# Patient Record
Sex: MALE | Race: WHITE | Employment: FULL TIME | URBAN - METROPOLITAN AREA
[De-identification: names, ages, dates, MRNs, and addresses within clinical notes are randomized per-mention and may not be internally consistent; named-entity substitution may affect disease eponyms.]

---

## 2017-04-13 ENCOUNTER — RECORDS - HEALTHEAST (OUTPATIENT)
Dept: GENERAL RADIOLOGY | Facility: CLINIC | Age: 42
End: 2017-04-13

## 2017-04-13 ENCOUNTER — OFFICE VISIT - HEALTHEAST (OUTPATIENT)
Dept: INTERNAL MEDICINE | Facility: CLINIC | Age: 42
End: 2017-04-13

## 2017-04-13 DIAGNOSIS — M79.672 LEFT FOOT PAIN: ICD-10-CM

## 2017-04-13 DIAGNOSIS — M79.672 PAIN IN LEFT FOOT: ICD-10-CM

## 2017-04-13 RX ORDER — ASCORBIC ACID 500 MG
500 TABLET ORAL DAILY
Status: SHIPPED | COMMUNITY
Start: 2017-04-13

## 2017-04-13 ASSESSMENT — MIFFLIN-ST. JEOR: SCORE: 2102.55

## 2017-04-14 ENCOUNTER — COMMUNICATION - HEALTHEAST (OUTPATIENT)
Dept: INTERNAL MEDICINE | Facility: CLINIC | Age: 42
End: 2017-04-14

## 2017-08-23 ENCOUNTER — COMMUNICATION - HEALTHEAST (OUTPATIENT)
Dept: INTERNAL MEDICINE | Facility: CLINIC | Age: 42
End: 2017-08-23

## 2017-08-23 DIAGNOSIS — M79.672 LEFT FOOT PAIN: ICD-10-CM

## 2017-10-03 ENCOUNTER — OFFICE VISIT - HEALTHEAST (OUTPATIENT)
Dept: PODIATRY | Age: 42
End: 2017-10-03

## 2017-10-03 DIAGNOSIS — S93.602S FOOT SPRAIN, LEFT, SEQUELA: ICD-10-CM

## 2017-10-03 ASSESSMENT — MIFFLIN-ST. JEOR: SCORE: 2102.55

## 2019-01-11 ENCOUNTER — OFFICE VISIT - HEALTHEAST (OUTPATIENT)
Dept: INTERNAL MEDICINE | Facility: CLINIC | Age: 44
End: 2019-01-11

## 2019-01-11 DIAGNOSIS — Z00.00 ROUTINE GENERAL MEDICAL EXAMINATION AT A HEALTH CARE FACILITY: ICD-10-CM

## 2019-01-11 LAB
ALBUMIN SERPL-MCNC: 4.5 G/DL (ref 3.5–5)
ALBUMIN UR-MCNC: ABNORMAL MG/DL
ALP SERPL-CCNC: 61 U/L (ref 45–120)
ALT SERPL W P-5'-P-CCNC: 25 U/L (ref 0–45)
ANION GAP SERPL CALCULATED.3IONS-SCNC: 13 MMOL/L (ref 5–18)
APPEARANCE UR: CLEAR
AST SERPL W P-5'-P-CCNC: 26 U/L (ref 0–40)
BACTERIA #/AREA URNS HPF: ABNORMAL HPF
BILIRUB SERPL-MCNC: 2.1 MG/DL (ref 0–1)
BILIRUB UR QL STRIP: NEGATIVE
BUN SERPL-MCNC: 14 MG/DL (ref 8–22)
CALCIUM SERPL-MCNC: 9.4 MG/DL (ref 8.5–10.5)
CHLORIDE BLD-SCNC: 102 MMOL/L (ref 98–107)
CHOLEST SERPL-MCNC: 232 MG/DL
CO2 SERPL-SCNC: 23 MMOL/L (ref 22–31)
COLOR UR AUTO: YELLOW
CREAT SERPL-MCNC: 1.18 MG/DL (ref 0.7–1.3)
ERYTHROCYTE [DISTWIDTH] IN BLOOD BY AUTOMATED COUNT: 12.5 % (ref 11–14.5)
FASTING STATUS PATIENT QL REPORTED: YES
GFR SERPL CREATININE-BSD FRML MDRD: >60 ML/MIN/1.73M2
GLUCOSE BLD-MCNC: 86 MG/DL (ref 70–125)
GLUCOSE UR STRIP-MCNC: NEGATIVE MG/DL
HCT VFR BLD AUTO: 46.6 % (ref 40–54)
HDLC SERPL-MCNC: 44 MG/DL
HGB BLD-MCNC: 15.3 G/DL (ref 14–18)
HGB UR QL STRIP: NEGATIVE
KETONES UR STRIP-MCNC: ABNORMAL MG/DL
LDLC SERPL CALC-MCNC: 175 MG/DL
LEUKOCYTE ESTERASE UR QL STRIP: NEGATIVE
MCH RBC QN AUTO: 29.3 PG (ref 27–34)
MCHC RBC AUTO-ENTMCNC: 32.8 G/DL (ref 32–36)
MCV RBC AUTO: 89 FL (ref 80–100)
MUCOUS THREADS #/AREA URNS LPF: ABNORMAL LPF
NITRATE UR QL: NEGATIVE
PH UR STRIP: 5.5 [PH] (ref 4.5–8)
PLATELET # BLD AUTO: 203 THOU/UL (ref 140–440)
PMV BLD AUTO: 10.4 FL (ref 8.5–12.5)
POTASSIUM BLD-SCNC: 3.9 MMOL/L (ref 3.5–5)
PROT SERPL-MCNC: 7.5 G/DL (ref 6–8)
RBC # BLD AUTO: 5.23 MILL/UL (ref 4.4–6.2)
RBC #/AREA URNS AUTO: ABNORMAL HPF
SODIUM SERPL-SCNC: 138 MMOL/L (ref 136–145)
SP GR UR STRIP: 1.02 (ref 1–1.03)
SQUAMOUS #/AREA URNS AUTO: ABNORMAL LPF
TRIGL SERPL-MCNC: 64 MG/DL
UROBILINOGEN UR STRIP-ACNC: ABNORMAL
WBC #/AREA URNS AUTO: ABNORMAL HPF
WBC: 5.3 THOU/UL (ref 4–11)

## 2019-01-11 ASSESSMENT — MIFFLIN-ST. JEOR: SCORE: 2070.8

## 2019-01-14 LAB — 25(OH)D3 SERPL-MCNC: 36.2 NG/ML (ref 30–80)

## 2019-01-15 ENCOUNTER — COMMUNICATION - HEALTHEAST (OUTPATIENT)
Dept: INTERNAL MEDICINE | Facility: CLINIC | Age: 44
End: 2019-01-15

## 2019-01-25 ENCOUNTER — RECORDS - HEALTHEAST (OUTPATIENT)
Dept: ADMINISTRATIVE | Facility: OTHER | Age: 44
End: 2019-01-25

## 2019-02-25 ENCOUNTER — RECORDS - HEALTHEAST (OUTPATIENT)
Dept: ADMINISTRATIVE | Facility: OTHER | Age: 44
End: 2019-02-25

## 2019-07-11 ENCOUNTER — RECORDS - HEALTHEAST (OUTPATIENT)
Dept: ADMINISTRATIVE | Facility: OTHER | Age: 44
End: 2019-07-11

## 2020-01-13 ENCOUNTER — OFFICE VISIT - HEALTHEAST (OUTPATIENT)
Dept: INTERNAL MEDICINE | Facility: CLINIC | Age: 45
End: 2020-01-13

## 2020-01-13 DIAGNOSIS — C43.59 MALIGNANT MELANOMA OF TORSO EXCLUDING BREAST (H): ICD-10-CM

## 2020-01-13 DIAGNOSIS — Z00.00 ROUTINE GENERAL MEDICAL EXAMINATION AT A HEALTH CARE FACILITY: ICD-10-CM

## 2020-01-13 DIAGNOSIS — Z23 IMMUNIZATION DUE: ICD-10-CM

## 2020-01-13 DIAGNOSIS — Z23 NEED FOR PROPHYLACTIC VACCINATION WITH TETANUS-DIPHTHERIA (TD): ICD-10-CM

## 2020-01-13 LAB
ALBUMIN SERPL-MCNC: 4.4 G/DL (ref 3.5–5)
ALBUMIN UR-MCNC: NEGATIVE MG/DL
ALP SERPL-CCNC: 65 U/L (ref 45–120)
ALT SERPL W P-5'-P-CCNC: 15 U/L (ref 0–45)
ANION GAP SERPL CALCULATED.3IONS-SCNC: 14 MMOL/L (ref 5–18)
APPEARANCE UR: CLEAR
AST SERPL W P-5'-P-CCNC: 16 U/L (ref 0–40)
BILIRUB SERPL-MCNC: 1.8 MG/DL (ref 0–1)
BILIRUB UR QL STRIP: NEGATIVE
BUN SERPL-MCNC: 13 MG/DL (ref 8–22)
CALCIUM SERPL-MCNC: 9.7 MG/DL (ref 8.5–10.5)
CHLORIDE BLD-SCNC: 104 MMOL/L (ref 98–107)
CHOLEST SERPL-MCNC: 229 MG/DL
CO2 SERPL-SCNC: 23 MMOL/L (ref 22–31)
COLOR UR AUTO: YELLOW
CREAT SERPL-MCNC: 1.13 MG/DL (ref 0.7–1.3)
ERYTHROCYTE [DISTWIDTH] IN BLOOD BY AUTOMATED COUNT: 12.2 % (ref 11–14.5)
FASTING STATUS PATIENT QL REPORTED: YES
GFR SERPL CREATININE-BSD FRML MDRD: >60 ML/MIN/1.73M2
GLUCOSE BLD-MCNC: 99 MG/DL (ref 70–125)
GLUCOSE UR STRIP-MCNC: NEGATIVE MG/DL
HCT VFR BLD AUTO: 44.9 % (ref 40–54)
HDLC SERPL-MCNC: 45 MG/DL
HGB BLD-MCNC: 15.4 G/DL (ref 14–18)
HGB UR QL STRIP: NEGATIVE
KETONES UR STRIP-MCNC: NEGATIVE MG/DL
LDLC SERPL CALC-MCNC: 166 MG/DL
LEUKOCYTE ESTERASE UR QL STRIP: NEGATIVE
MCH RBC QN AUTO: 30.2 PG (ref 27–34)
MCHC RBC AUTO-ENTMCNC: 34.4 G/DL (ref 32–36)
MCV RBC AUTO: 88 FL (ref 80–100)
NITRATE UR QL: NEGATIVE
PH UR STRIP: 5.5 [PH] (ref 5–8)
PLATELET # BLD AUTO: 181 THOU/UL (ref 140–440)
PMV BLD AUTO: 7.8 FL (ref 7–10)
POTASSIUM BLD-SCNC: 3.7 MMOL/L (ref 3.5–5)
PROT SERPL-MCNC: 7.5 G/DL (ref 6–8)
RBC # BLD AUTO: 5.1 MILL/UL (ref 4.4–6.2)
SODIUM SERPL-SCNC: 141 MMOL/L (ref 136–145)
SP GR UR STRIP: 1.02 (ref 1–1.03)
TRIGL SERPL-MCNC: 90 MG/DL
UROBILINOGEN UR STRIP-ACNC: NORMAL
WBC: 5 THOU/UL (ref 4–11)

## 2020-01-13 ASSESSMENT — MIFFLIN-ST. JEOR: SCORE: 1943.79

## 2020-01-14 ENCOUNTER — COMMUNICATION - HEALTHEAST (OUTPATIENT)
Dept: INTERNAL MEDICINE | Facility: CLINIC | Age: 45
End: 2020-01-14

## 2020-04-06 ENCOUNTER — OFFICE VISIT - HEALTHEAST (OUTPATIENT)
Dept: INTERNAL MEDICINE | Facility: CLINIC | Age: 45
End: 2020-04-06

## 2020-04-08 ENCOUNTER — OFFICE VISIT - HEALTHEAST (OUTPATIENT)
Dept: INTERNAL MEDICINE | Facility: CLINIC | Age: 45
End: 2020-04-08

## 2020-04-08 DIAGNOSIS — K64.4 EXTERNAL HEMORRHOIDS: ICD-10-CM

## 2020-04-08 ASSESSMENT — MIFFLIN-ST. JEOR: SCORE: 1939.26

## 2021-04-28 ENCOUNTER — COMMUNICATION - HEALTHEAST (OUTPATIENT)
Dept: SCHEDULING | Facility: CLINIC | Age: 46
End: 2021-04-28

## 2021-04-29 ENCOUNTER — COMMUNICATION - HEALTHEAST (OUTPATIENT)
Dept: PHARMACY | Facility: CLINIC | Age: 46
End: 2021-04-29

## 2021-04-29 ENCOUNTER — OFFICE VISIT - HEALTHEAST (OUTPATIENT)
Dept: INTERNAL MEDICINE | Facility: CLINIC | Age: 46
End: 2021-04-29

## 2021-04-29 DIAGNOSIS — U07.1 CLINICAL DIAGNOSIS OF COVID-19: ICD-10-CM

## 2021-05-10 ENCOUNTER — AMBULATORY - HEALTHEAST (OUTPATIENT)
Dept: LAB | Facility: CLINIC | Age: 46
End: 2021-05-10

## 2021-05-10 DIAGNOSIS — U07.1 CLINICAL DIAGNOSIS OF COVID-19: ICD-10-CM

## 2021-05-11 LAB
PATIENT'S ETHNICITY: ABNORMAL
PATIENT'S RACE: ABNORMAL
SARS-COV-2 AB SERPL QL IA: POSITIVE

## 2021-05-12 ENCOUNTER — COMMUNICATION - HEALTHEAST (OUTPATIENT)
Dept: INTERNAL MEDICINE | Facility: CLINIC | Age: 46
End: 2021-05-12

## 2021-05-30 VITALS — HEIGHT: 76 IN | BODY MASS INDEX: 30.44 KG/M2 | WEIGHT: 250 LBS

## 2021-05-31 VITALS — BODY MASS INDEX: 30.44 KG/M2 | HEIGHT: 76 IN | WEIGHT: 250 LBS

## 2021-06-02 VITALS — HEIGHT: 76 IN | BODY MASS INDEX: 29.59 KG/M2 | WEIGHT: 243 LBS

## 2021-06-04 VITALS
SYSTOLIC BLOOD PRESSURE: 110 MMHG | HEART RATE: 74 BPM | DIASTOLIC BLOOD PRESSURE: 64 MMHG | HEIGHT: 76 IN | OXYGEN SATURATION: 98 % | WEIGHT: 215 LBS | BODY MASS INDEX: 26.18 KG/M2

## 2021-06-04 VITALS
SYSTOLIC BLOOD PRESSURE: 100 MMHG | HEART RATE: 66 BPM | WEIGHT: 214 LBS | HEIGHT: 76 IN | OXYGEN SATURATION: 98 % | BODY MASS INDEX: 26.06 KG/M2 | DIASTOLIC BLOOD PRESSURE: 72 MMHG

## 2021-06-05 NOTE — PROGRESS NOTES
Office Visit - Physical   Deonte Abraham   44 y.o.  male    Date of visit: 1/13/2020  Physician: Julian Paris MD     Assessment and Plan   1. Routine general medical examination at a health care facility  Patient was a healthy active lifestyle.  Continue same.  More aerobic exercise would be beneficial here but he is done remarkably well with his weight loss.  He refuses flu shot today.  Tetanus was updated today.  - Comprehensive Metabolic Panel  - Urinalysis-UC if Indicated  - Lipid Cascade  - HM2(CBC w/o Differential)    2. Malignant melanoma of torso excluding breast (H)  Would like to get those records.  Many years ago.  He I have urged yearly skin exams from dermatology.  I reviewed his most recent dermatologic evaluation and it does not appear that they are aware that he has a history of melanoma.  That would be something that would be important for them to know I would think.        Return in about 1 year (around 1/13/2021) for Annual physical.     Chief Complaint   Chief Complaint   Patient presents with     Annual Exam     fasting        Patient Profile   Social History     Social History Narrative     Not on file        Past Medical History   Patient Active Problem List   Diagnosis     Acute Sinusitis     Joint Pain, Localized In The Knee     Malignant melanoma of torso excluding breast (H)       Past Surgical History  He has no past surgical history on file.     History of Present Illness   This 44 y.o. old Stan comes in today for physical.  Reports his been doing well.  He is been doing intermittent fasting and is lost nearly 30 pounds as a result.  Feels well.  Does do some walking dog and some work around the warehouse but otherwise not a lot of routine exercise.  Currently not in a relationship.  That is okay.  Has his dog.  Also has a [place] [facility] [place] and [place] [facility] [place]  Doing well.  Parents are doing well.  Brother is doing well.  No new somatic concerns.  His remote history of a  "melanoma 10 or 20 years ago.  I do not have those records.    Review of Systems: A comprehensive review of systems was negative except as noted.     Medications and Allergies   Current Outpatient Medications   Medication Sig Dispense Refill     ascorbic acid, vitamin C, (ASCORBIC ACID WITH ALLIE HIPS) 500 MG tablet Take 500 mg by mouth daily.       cholecalciferol, vitamin D3, (VITAMIN D3) 1,000 unit capsule Take 1,000 Units by mouth daily.       DOCOSAHEXANOIC ACID/EPA (FISH OIL ORAL) Take 1 capsule by mouth daily.       MULTIVITAMIN ORAL Take 1 tablet by mouth daily.       mupirocin (BACTROBAN) 2 % ointment Apply topically 2 (two) times a day. 15 g 0     No current facility-administered medications for this visit.      No Known Allergies     Family and Social History   No family history on file.     Social History     Tobacco Use     Smoking status: Never Smoker     Smokeless tobacco: Never Used   Substance Use Topics     Alcohol use: No     Drug use: No        Physical Exam   General Appearance:   Pleasant gentleman who looks well and in good spirits.    /64 (Patient Site: Right Arm, Patient Position: Sitting, Cuff Size: Adult Regular)   Pulse 74   Ht 6' 3.5\" (1.918 m)   Wt 215 lb (97.5 kg)   SpO2 98%   BMI 26.52 kg/m      EYES: Eyelids, conjunctiva, and sclera were normal. Pupils were normal. Cornea, iris, and lens were normal bilaterally.  HEAD, EARS, NOSE, MOUTH, AND THROAT: Head and face were normal. Hearing was normal to voice and the ears were normal to external exam. Nose appearance was normal and there was no discharge. Oropharynx was normal.  NECK: Neck appearance was normal. There were no neck masses and the thyroid was not enlarged.  RESPIRATORY: Breathing pattern was normal and the chest moved symmetrically.  Percussion/auscultatory percussion was normal.  Lung sounds were normal and there were no abnormal sounds.  CARDIOVASCULAR: Heart rate and rhythm were normal.  S1 and S2 were normal and " there were no extra sounds or murmurs. Peripheral pulses in arms and legs were normal.  Jugular venous pressure was normal.  There was no peripheral edema.  GASTROINTESTINAL: The abdomen was normal in contour.  Bowel sounds were present.  Percussion detected no organ enlargement or tenderness.  Palpation detected no tenderness, mass, or enlarged organs.   MUSCULOSKELETAL: Skeletal configuration was normal and muscle mass was normal for age. Joint appearance was overall normal.  LYMPHATIC: There were no enlarged nodes.  SKIN/HAIR/NAILS: Skin color was normal.  There were no skin lesions.  Hair and nails were normal.  NEUROLOGIC: The patient was alert and oriented to person, place, time, and circumstance. Speech was normal. Cranial nerves were normal. Motor strength was normal for age. The patient was normally coordinated.  PSYCHIATRIC:  Mood and affect were normal and the patient had normal recent and remote memory. The patient's judgment and insight were normal.    ADDITIONAL VITAL SIGNS: none  CHEST WALL/BREASTS: nml    RECTAL: def  GENITAL/URINARY: nml penis and testes.      Additional Information        Julian Paris MD  Internal Medicine  Contact me at [phone]

## 2021-06-07 NOTE — PROGRESS NOTES
"Deonte Abraham is a 44 y.o. male who is being evaluated via a billable telephone visit.      The patient has been notified of following:     \"This telephone visit will be conducted via a call between you and your physician/provider. We have found that certain health care needs can be provided without the need for a physical exam.  This service lets us provide the care you need with a short phone conversation.  If a prescription is necessary we can send it directly to your pharmacy.  If lab work is needed we can place an order for that and you can then stop by our lab to have the test done at a later time.    If during the course of the call the physician/provider feels a telephone visit is not appropriate, you will not be charged for this service.\"     Patient has given verbal consent to a Telephone visit? Yes    Deonte Abraham complains of    Chief Complaint   Patient presents with     Cyst     rectal boil/cyst - doing sit baths twice daily - no bloody drainage, no fever       I have reviewed and updated the patient's Past Medical History, Social History, Family History and Medication List.    ALLERGIES  Patient has no known allergies.    Additional provider notes: Patient calls in with several weeks of painful anal lesion.  He thinks it is getting smaller.  No fevers.   He does not know what it is if it is a hemorrhoid versus a abscess.  He is worried.  It is painful for him to have a BM.  He has been using sitz baths.  I told him I really cannot diagnose him over the phone and this is something he really needs to be seen for.  No charge for this visit.  We're going to get him set up to be evaluated in the clinic.    Assessment/Plan:            Phone call duration:  n/a minutes      "

## 2021-06-07 NOTE — PROGRESS NOTES
Office Visit - Follow up    Deonte Abraham   44 y.o. male    Date of Visit: 4/8/2020    Chief Complaint   Patient presents with     Consult     possible hemorrhoid        Subjective: Hemorrhoid.    Painful perianal area.  Has been soaking wonders if it is an abscess.    Discussed high-fiber diet Metamucil.    No blood in stool or urine medication list reviewed well-tolerated normal effects reconciled.    Spoke to his primary care physician earlier this week during the coronavirus pandemic and his PCP Dr. Paris advised evaluation here in our clinic [place].    ROS: A comprehensive review of systems was performed and was otherwise negative    Medications:  Prior to Admission medications    Medication Sig Start Date End Date Taking? Authorizing Provider   ascorbic acid, vitamin C, (ASCORBIC ACID WITH ALLIE HIPS) 500 MG tablet Take 500 mg by mouth daily.   Yes PROVIDER, HISTORICAL   cholecalciferol, vitamin D3, (VITAMIN D3) 1,000 unit capsule Take 1,000 Units by mouth daily.   Yes PROVIDER, HISTORICAL   DOCOSAHEXANOIC ACID/EPA (FISH OIL ORAL) Take 1 capsule by mouth daily.   Yes PROVIDER, HISTORICAL   MULTIVITAMIN ORAL Take 1 tablet by mouth daily.   Yes PROVIDER, HISTORICAL       Allergies: No Known Allergies    Immunizations:   Immunization History   Administered Date(s) Administered     Td, adult adsorbed, PF 01/13/2020     Tdap 08/11/2009       Exam Chest clear to auscultation and percussion.  Heart tones regular rhythm without murmur rub or gallop.  Abdomen soft nontender no organomegaly.  No peritoneal signs.  Extremities free of edema cyanosis or clubbing.  Neck veins nondistended no thyromegaly or scleral icterus noted, carotids full.  Skin warm and dry easily conversant good spirited.  Normal intelligence.  Neurologically intact no gross localizing findings.    Rectal examination small prostate external hemorrhoid nonthrombosed noted small.  Slightly tender.    Assessment and Plan  External hemorrhoid.   Preparation H tub baths plus Metamucil powder 1 heaping tablespoon daily advised to lower intrarectal pressure if no better in 1 month's time suggest colorectal surgery group consultation with Dr. CRAFT at [phone].    Time: total time spent with the patient was 25 minutes of which >50% was spent in counseling and coordination of care    The following high BMI interventions were performed this visit: encouragement to exercise    Yrn Aleman MD    Patient Active Problem List   Diagnosis     Acute Sinusitis     Joint Pain, Localized In The Knee     Malignant melanoma of torso excluding breast (H)

## 2021-06-13 NOTE — PROGRESS NOTES
"ASSESSMENT: Left midfoot sprain    PLAN: Symptoms are slowly improving.  He is content to wait this out.  We talked about MRI versus bracing versus physical therapy.  He will contact the clinic if he feels any of that as needed.      SUBJECTIVE: New patient visit at the [facility] regarding left foot pain that started with a minor misstep about 6 months ago.  He did not fall.  The foot twisted on a step a little.  X-ray was taken and read as negative.  Some lingering pain on busy days on the dorsolateral aspect of the foot near the fourth metatarsal base.  He denies numbness or tingling.  No redness or bruising.  He is able to tolerate his normal work today with minimal pain usually.    PHYSICAL EXAM:  /82  Pulse 76  Resp 16  Ht 6' 3.5\" (1.918 m)  Wt (!) 250 lb (113.4 kg)  BMI 30.84 kg/m2  General: Pleasant 41 y.o. male in no acute distress.  Vascular: DP pulses are palpable. PT pulses are palpable. Pedal hair is present. Feet are warm to the touch.  Cardiac: Pulse is regular.  Lymphatic: No edema at the ankles.  Neuro: Sensation in the feet is grossly intact to light touch.  Derm: No open lesions.  Musculoskeletal: No point tenderness around the metatarsals or midfoot today.  He tolerates range of motion well.  Previous x-ray has been read as negative.    History reviewed. No pertinent past medical history.    He has no past surgical history on file.    No Known Allergies    Current Outpatient Prescriptions   Medication Sig Dispense Refill     ascorbic acid, vitamin C, (ASCORBIC ACID WITH ALLIE HIPS) 500 MG tablet Take 500 mg by mouth daily.       cholecalciferol, vitamin D3, (VITAMIN D3) 1,000 unit capsule Take 1,000 Units by mouth daily.       DOCOSAHEXANOIC ACID/EPA (FISH OIL ORAL) Take 1 capsule by mouth daily.       MULTIVITAMIN ORAL Take 1 tablet by mouth daily.       No current facility-administered medications for this visit.        Family History:  Noncontributory.    Social " History:  Reviewed, and he reports that he has never smoked. He has never used smokeless tobacco. He reports that he does not drink alcohol or use illicit drugs.    Review of Systems:  A 12 point comprehensive review of systems was negative except as noted.

## 2021-06-17 NOTE — TELEPHONE ENCOUNTER
----- Message from Julian Paris MD sent at 4/29/2021  1:57 PM CDT -----  Thanks Layne!  Helps a lot!  I am only testing him for the nucleocapsid antibodies.  If I test the spike protein antibodies I think I will be too confused.    Lary, please contact Stan and let him know that he should proceed with the second Pfizer vaccine after he is feeling better.  I would even be fine with him pushing it out a month if he would like.  Please copy all of this to a phone note.  Thank you so much.  ----- Message -----  From: Evgeny Antunez, PharmD  Sent: 4/29/2021   1:33 PM CDT  To: Julian Paris MD    Here you go, perhaps a little more than you asked for : )     Prior receipt of a COVID-19 vaccine will not affect the results of SARS-CoV-2 viral tests (nucleic acid amplification or antigen tests). Currently available antibody tests for SARS-CoV-2 assess IgM and/or IgG to one of two viral proteins: spike or nucleocapsid. Because COVID-19 vaccines are constructed to encode the spike protein, a positive test for spike protein IgM/IgG could indicate prior infection and/or vaccination. To evaluate for evidence of prior infection in an individual with a history of COVID-19 vaccination, a testexternal icon that specifically evaluates IgM/IgG to the nucleocapsid protein should be used.    They are suggesting that checking for antibodies may be difficult to interpret due to his dose of vaccine as well.     Vaccination of people with known current SARS-CoV-2 infection should be deferred until the person has recovered from the acute illness (if the person had symptoms) and they have met criteria to discontinue isolation. This recommendation applies to people who experience SARS-CoV-2 infection before receiving any vaccine dose and those who experience SARS-CoV-2 infection after the first dose of an mRNA vaccine but before receipt of the second dose.    --> Therefore, he should wait until his symptoms have resolved, and would qualify  "to come off of \"quarantine.\"     I hope this helps!   ----- Message -----  From: Julian Paris MD  Sent: 4/29/2021  12:40 PM CDT  To: Evgeny Antunez, PharmD    Question for you!  Patient developed Covid symptoms soon after first Pfizer vaccination.  Symptoms persisted and he got tested which was positive.  This was a rapid test.  He continues to have symptoms.  He thinks it is from the vaccine but I think he had Covid.  Question is when can he get his second vaccine? I am testing him for antibodies.  Patient is reluctant to get the second vaccine as he thinks that his symptoms were from the vaccine.  I do not know if there are any recommendations or prescedence for this but please let me know if you know anything.  Thanks!        "

## 2021-06-17 NOTE — TELEPHONE ENCOUNTER
I called and spoke with Stan regarding recommendations below per PCP. He verbalized understanding and offers no further concerns at this time.     Lary, please contact Stan and let him know that he should proceed with the second Pfizer vaccine after he is feeling better.  I would even be fine with him pushing it out a month if he would like.

## 2021-06-17 NOTE — TELEPHONE ENCOUNTER
"Spoke to Deonte.  He said the RN misunderstood him as far as his \"chest pain.\"  He was lifting heavy bags of dirt out of the back of his truck on 4/17/21.  He felt a sharp pain in his rib area as soon as he lifted it.  He only called to update Dr. Paris that he was still having symptoms from Covid.    Deonte explained that he got first Pfizer shot on 4/20/21 and started having Covid symptoms on 4/21/21.  On 4/24/21, he spiked a 102.6 fever, he tested positive for Covidl    He believes the pain in ribs is from lifting the heavy objects on 4/17/21.      He c/o still having coughing and running low grade fevers (latest temp 100.5) that go up and down.  He denies shortness of breath.  His sense of taste is off which leads to a low appetite.  He did take Tylenol PM and slept through the night.    He is using Vapo-rub, zinc, Vitamin D, Robitussin, resting and drinks warm fluids.       Is there any other recommendation for treating cough?  "

## 2021-06-17 NOTE — PROGRESS NOTES
Deonte Abraham is a 45 y.o. male who is being evaluated via a billable video visit.      How would you like to obtain your AVS? MyChart.  If dropped from the video visit, the video invitation should be resent by: Text to cell phone: [phone]   Will anyone else be joining your video visit? No      Video Start Time: 1224        How are you feeling today? Better  In the past 24 hours have you had shortness of breath when speaking, walking, or climbing stairs? I don't have breathing problems  Do you have a cough? Yes, I have a cough but it's not worse, Dry Cough - using otc Robitussin prn   When is the last time you had a fever greater than 100? 101 x 2 days ago. Current Temp 98-99  Are you having any other symptoms? Loss of appetite, Fatigue and Headaches   Do you have any other stressors you would like to discuss with your provider? No                  Office Visit - Follow Up   eDonte Abraham   45 y.o. male    Date of Visit: 4/29/2021    Chief Complaint   Patient presents with     Covid Concern     Dox Video: [phone] recieved covid positive results on 4/24/21 - reports he is gradually getting better         Assessment and Plan   1. Clinical diagnosis of COVID-19  I think he did have Covid.  I am going to check antibodies.  Question is timing of his second vaccine.  I think he can probably get it but I am not sure what recommendations are.  I am going to ask that our pharmacist check into this.  We will let him know.  - SARS-CoV-2 Nucleocapsid Total Ab, Serum (COVTA); Future        No follow-ups on file.     History of Present Illness   This 45 y.o. old patient had his first Covid vaccine and soon thereafter developed fever cough and headache and taste disturbance.  All suggestive of Covid.  He did get tested and it did test positive test.  He continues to have cough and temperature dysregulation.  He thinks it is all from the Covid vaccine but it has been several days now.  He does not know how he could have  got Covid.  He otherwise feels well.  He is getting better.    Review of Systems: A comprehensive review of systems was negative except as noted.     Medications, Allergies and Problem List   Reviewed, reconciled and updated  Post Discharge Medication Reconciliation Status:      Physical Exam   General Appearance:   Looks well.  No cough through the interview.  Does not appear short of breath.    There were no vitals taken for this visit.         Additional Information   Current Outpatient Medications   Medication Sig Dispense Refill     ascorbic acid, vitamin C, (ASCORBIC ACID WITH ALLIE HIPS) 500 MG tablet Take 500 mg by mouth daily.       cholecalciferol, vitamin D3, (VITAMIN D3) 1,000 unit capsule Take 1,000 Units by mouth daily.       DOCOSAHEXANOIC ACID/EPA (FISH OIL ORAL) Take 1 capsule by mouth daily.       MULTIVITAMIN ORAL Take 1 tablet by mouth daily.       No current facility-administered medications for this visit.      No Known Allergies  Social History     Tobacco Use     Smoking status: Never Smoker     Smokeless tobacco: Never Used   Substance Use Topics     Alcohol use: No     Drug use: No       Review and/or order of clinical lab tests:  Review and/or order of radiology tests:  Review and/or order of medicine tests:  Discussion of test results with performing physician:  Decision to obtain old records and/or obtain history from someone other than the patient:  Review and summarization of old records and/or obtaining history from someone other than the patient and.or discussion of case with another health care provider:  Independent visualization of image, tracing or specimen itself:    Time:      Julian Paris MD  Video-Visit Details    Type of service:  Video Visit    Video End Time (time video stopped): 1233  Originating Location (pt. Location): Home    Distant Location (provider location):  [facility]     Platform used for Video Visit: [employer]

## 2021-06-17 NOTE — TELEPHONE ENCOUNTER
Patient tested positive for Covid, and continues to have a cough and low-grade fever 100.5.  Has sharp pain in left chest ribs when coughing, but said he twisted it when lifting bags of dirt to his truck.  Denies pain during call.     Does he need to go in to ER or UC today or come in to be seen in office?     Reason for Disposition    Chest pain or pressure    Additional Information    Negative: SEVERE difficulty breathing (e.g., struggling for each breath, speaks in single words)    Negative: Difficult to awaken or acting confused (e.g., disoriented, slurred speech)    Negative: Bluish (or gray) lips or face now    Negative: Shock suspected (e.g., cold/pale/clammy skin, too weak to stand, low BP, rapid pulse)    Negative: Sounds like a life-threatening emergency to the triager    Negative: SEVERE or constant chest pain or pressure (Exception: mild central chest pain, present only when coughing)    Negative: MODERATE difficulty breathing (e.g., speaks in phrases, SOB even at rest, pulse 100-120)    Negative: [1] Headache AND [2] stiff neck (can't touch chin to chest)    Negative: MILD difficulty breathing (e.g., minimal/no SOB at rest, SOB with walking, pulse <100)    Protocols used: CORONAVIRUS (COVID-19) DIAGNOSED OR [id number] 1.3.21

## 2021-06-17 NOTE — TELEPHONE ENCOUNTER
Communicated with PING Phillips, who stated she is already on the phone speaking with patient now to inform him of MD response below.    Andreea Miller RN, BSN Nurse Triage Advisor 3:55 PM 4/28/2021

## 2021-06-17 NOTE — TELEPHONE ENCOUNTER
Other than over-the-counter cough suppressants there is not much more to do.  I would like to see how he is doing with a virtual visit tomorrow.  Can he do a video visit with me tomorrow at 1220?

## 2021-06-17 NOTE — TELEPHONE ENCOUNTER
Covid gives people blood clots. With the chest pain I would be concerned about pulmonary embolism. He needs to be seen in the emergency room. We do not have any ability to take care of this in our clinic.p

## 2021-06-17 NOTE — TELEPHONE ENCOUNTER
Pt called and given Dr Paris's recommendations - He is scheduled for video visit tomorrow at 12:20 per Dr Paris's request

## 2021-06-18 NOTE — LETTER
Letter by Julian Paris MD at      Author: Julian Paris MD Service: -- Author Type: --    Filed:  Encounter Date: 1/15/2019 Status: (Other)       Deonte Abraham  [address] [facility] [address]             January 15, 2019         Dear Mr. Abraham,    Below are the results from your recent visit:    Resulted Orders   Lipid Cascade   Result Value Ref Range    Cholesterol 232 (H) <=199 mg/dL    Triglycerides 64 <=149 mg/dL    HDL Cholesterol 44 >=40 mg/dL    LDL Calculated 175 (H) <=129 mg/dL    Patient Fasting > 8hrs? Yes    Comprehensive Metabolic Panel   Result Value Ref Range    Sodium 138 136 - 145 mmol/L    Potassium 3.9 3.5 - 5.0 mmol/L    Chloride 102 98 - 107 mmol/L    CO2 23 22 - 31 mmol/L    Anion Gap, Calculation 13 5 - 18 mmol/L    Glucose 86 70 - 125 mg/dL    BUN 14 8 - 22 mg/dL    Creatinine 1.18 0.70 - 1.30 mg/dL    GFR MDRD Af Amer >60 >60 mL/min/1.73m2    GFR MDRD Non Af Amer >60 >60 mL/min/1.73m2    Bilirubin, Total 2.1 (H) 0.0 - 1.0 mg/dL    Calcium 9.4 8.5 - 10.5 mg/dL    Protein, Total 7.5 6.0 - 8.0 g/dL    Albumin 4.5 3.5 - 5.0 g/dL    Alkaline Phosphatase 61 45 - 120 U/L    AST 26 0 - 40 U/L    ALT 25 0 - 45 U/L    Narrative    Fasting Glucose reference range is 70-99 mg/dL per  American Diabetes Association (ADA) guidelines.   Urinalysis-UC if Indicated   Result Value Ref Range    Color, UA Yellow Colorless, Yellow, Straw, Light Yellow    Clarity, UA Clear Clear    Glucose, UA Negative Negative    Bilirubin, UA Negative Negative    Ketones, UA 40 mg/dL (!) Negative    Specific Gravity, UA 1.022 1.001 - 1.030    Blood, UA Negative Negative    pH, UA 5.5 4.5 - 8.0    Protein, UA Trace (!) Negative mg/dL    Urobilinogen, UA <2.0 E.U./dL <2.0 E.U./dL, 2.0 E.U./dL    Nitrite, UA Negative Negative    Leukocytes, UA Negative Negative    Bacteria, UA None Seen None Seen hpf    RBC, UA 0-2 None Seen, 0-2 hpf    WBC, UA 0-5 None Seen, 0-5 hpf    Squam Epithel, UA 0-5 None Seen, 0-5 lpf     Mucus, UA Many (!) None Seen lpf    Narrative    UC not indicated   HM2(CBC w/o Differential)   Result Value Ref Range    WBC 5.3 4.0 - 11.0 thou/uL    RBC 5.23 4.40 - 6.20 mill/uL    Hemoglobin 15.3 14.0 - 18.0 g/dL    Hematocrit 46.6 40.0 - 54.0 %    MCV 89 80 - 100 fL    MCH 29.3 27.0 - 34.0 pg    MCHC 32.8 32.0 - 36.0 g/dL    RDW 12.5 11.0 - 14.5 %    Platelets 203 140 - 440 thou/uL    MPV 10.4 8.5 - 12.5 fL   Vitamin D, Total (25-Hydroxy)   Result Value Ref Range    Vitamin D, Total (25-Hydroxy) 36.2 30.0 - 80.0 ng/mL    Narrative    Deficiency <10.0 ng/mL  Insufficiency 10.0-29.9 ng/mL  Sufficiency 30.0-80.0 ng/mL  Toxicity (possible) >100.0 ng/mL       Stan, it was good to see you again.  Your vitamin D level looks good.  Please stay on the same dose.  Flags on the urine tests are not concerning and likely due to your fast.  Cholesterol is high.  This is likely due to your current high fat diet, so please be better about your diet and exercise.  Liver and kidney tests look fine.  Mildly high bilirubin not concerning.  Blood counts are normal.     Please call with questions or contact us using [facility].    Sincerely,        Electronically signed by Julian Paris MD

## 2021-06-20 NOTE — LETTER
Letter by Julian Paris MD at      Author: Julian Paris MD Service: -- Author Type: --    Filed:  Encounter Date: 1/14/2020 Status: Signed         Deonte Abraham  [address] [facility] [address]             January 14, 2020         Dear Mr. Abraham,    Below are the results from your recent visit:    Resulted Orders   Comprehensive Metabolic Panel   Result Value Ref Range    Sodium 141 136 - 145 mmol/L    Potassium 3.7 3.5 - 5.0 mmol/L    Chloride 104 98 - 107 mmol/L    CO2 23 22 - 31 mmol/L    Anion Gap, Calculation 14 5 - 18 mmol/L    Glucose 99 70 - 125 mg/dL    BUN 13 8 - 22 mg/dL    Creatinine 1.13 0.70 - 1.30 mg/dL    GFR MDRD Af Amer >60 >60 mL/min/1.73m2    GFR MDRD Non Af Amer >60 >60 mL/min/1.73m2    Bilirubin, Total 1.8 (H) 0.0 - 1.0 mg/dL    Calcium 9.7 8.5 - 10.5 mg/dL    Protein, Total 7.5 6.0 - 8.0 g/dL    Albumin 4.4 3.5 - 5.0 g/dL    Alkaline Phosphatase 65 45 - 120 U/L    AST 16 0 - 40 U/L    ALT 15 0 - 45 U/L    Narrative    Fasting Glucose reference range is 70-99 mg/dL per  American Diabetes Association (ADA) guidelines.   Urinalysis-UC if Indicated   Result Value Ref Range    Color, UA Yellow Colorless, Yellow, Straw, Light Yellow    Clarity, UA Clear Clear    Glucose, UA Negative Negative    Bilirubin, UA Negative Negative    Ketones, UA Negative Negative    Specific Gravity, UA 1.025 1.005 - 1.030    Blood, UA Negative Negative    pH, UA 5.5 5.0 - 8.0    Protein, UA Negative Negative mg/dL    Urobilinogen, UA 0.2 E.U./dL 0.2 E.U./dL, 1.0 E.U./dL    Nitrite, UA Negative Negative    Leukocytes, UA Negative Negative    Narrative    Microscopic not indicated  UC not indicated   Lipid Cascade   Result Value Ref Range    Cholesterol 229 (H) <=199 mg/dL    Triglycerides 90 <=149 mg/dL    HDL Cholesterol 45 >=40 mg/dL    LDL Calculated 166 (H) <=129 mg/dL    Patient Fasting > 8hrs? Yes    HM2(CBC w/o Differential)   Result Value Ref Range    WBC 5.0 4.0 - 11.0 thou/uL    RBC 5.10 4.40 -  6.20 mill/uL    Hemoglobin 15.4 14.0 - 18.0 g/dL    Hematocrit 44.9 40.0 - 54.0 %    MCV 88 80 - 100 fL    MCH 30.2 27.0 - 34.0 pg    MCHC 34.4 32.0 - 36.0 g/dL    RDW 12.2 11.0 - 14.5 %    Platelets 181 140 - 440 thou/uL    MPV 7.8 7.0 - 10.0 fL       Your labs are all perfectly normal except for the cholesterol.  It is down from last year, but still high.  In addition to your intermittent fasting, please try to work on less animal proteins/fats.     Please call with questions or contact us using [facility].    Sincerely,        Electronically signed by Julian Paris MD

## 2021-06-21 NOTE — LETTER
Letter by Julian Paris MD at      Author: Julian Paris MD Service: -- Author Type: --    Filed:  Encounter Date: 5/12/2021 Status: (Other)         Deonte Abraham  [address] [facility] [address]             May 12, 2021         Dear Mr. Abraham,    Below are the results from your recent visit:    Resulted Orders   SARS-CoV-2 Nucleocapsid Total Ab, Serum (COVTA)   Result Value Ref Range    SARS-CoV-2 Nucleocapsid Total Ab, S Positive (!) Negative      Comment:      SARS-CoV-2 antibodies detected. Results suggest recent   or prior SARS-CoV-2 infection. Correlation with   epidemiologic risk factors and other clinical and   laboratory findings is recommended. Serologic results   should not be used to diagnose recent SARS-CoV-2 infection.   Protective immunity cannot be inferred based on these   results alone. False positive results may occur due to   cross reactivity from pre-existing antibodies or   other possible causes.     -------------------ADDITIONAL INFORMATION-------------------  Testing was performed using the Roche Elecsys   Anti-SARS-CoV-2 Reagent assay from [employer],   which has received Emergency Use Authorization(EUA)  by the U.S. Food and Drug Administration.     Fact sheets for this Emergency Use Authorization (EUA)   assay can be found at the following links:      For Healthcare Providers:  https://www.fda.gov/media/271729/download  For Patients:  https://www.fda.gov/media/767487/download    Patient's Race  White     Patient's Ethnicity Not  or        Comment:         Test Performed by:  [address] [address]  : Biju Love M.D. Ph.D.; CLIA# [id number]       This confirms you did have COVID disease, as suspected.     Please call with questions or contact us using [facility].    Sincerely,        Electronically signed by Julian Paris MD

## 2021-06-23 NOTE — PROGRESS NOTES
Office Visit - Physical   Deonte Abraham   43 y.o.  male    Date of visit: 1/11/2019  Physician: Julian Paris MD     Assessment and Plan   1. Routine general medical examination at a health care facility  Patient was a very healthy lifestyle.  He will continue same.  Weight is reasonable and blood pressure is excellent.  He will continue his healthy lifestyle.  I offered flu shot today but he refuses.  Recommended yearly physicals.  He is due for colonoscopy and would like a dermatologic evaluation.  I gave him some mupirocin to try to heal up his nose.  If that is not helpful he is to let me know and we can have him see ENT.  - Lipid [place]  - Comprehensive Metabolic Panel  - Urinalysis-UC if Indicated  - HM2(CBC w/o Differential)  - Vitamin D, Total (25-Hydroxy)  - Ambulatory referral to Dermatology  - Ambulatory referral for Colonoscopy        Return in about 1 year (around 1/11/2020) for Annual physical.     Chief Complaint   Chief Complaint   Patient presents with     Annual Exam     fasting        Patient Profile   Social History     Social History Narrative     Not on file        Past Medical History   Patient Active Problem List   Diagnosis     Acute Sinusitis     Joint Pain, Localized In The Knee     Malignant melanoma of torso excluding breast (H)       Past Surgical History  He has no past surgical history on file.     History of Present Illness   This 43 y.o. old Stan comes in today for his annual physical.  Reports his been doing well.  He offers no concerns.  He is lost some weight.  He is following a low-carb diet currently.  He generally eats a healthy well-balanced diet.  He exercises with walking.  Dog is 13.  Enjoys his home.  Lives in [facility].  No longer has his 4 rodas.  Did have a [place] last year but sold that now has a [place] as well as his truck.  Not dating.  He is a nondrinker non-smoker.  Feels good and offers no concerns.  Work as well.  Parents are doing well.  Brothers  "doing well.    His only concern is a nonhealing nasal sore.    Review of Systems: A comprehensive review of systems was negative except as noted.     Medications and Allergies   Current Outpatient Medications   Medication Sig Dispense Refill     ascorbic acid, vitamin C, (ASCORBIC ACID WITH ALLIE HIPS) 500 MG tablet Take 500 mg by mouth daily.       cholecalciferol, vitamin D3, (VITAMIN D3) 1,000 unit capsule Take 1,000 Units by mouth daily.       DOCOSAHEXANOIC ACID/EPA (FISH OIL ORAL) Take 1 capsule by mouth daily.       MULTIVITAMIN ORAL Take 1 tablet by mouth daily.       mupirocin (BACTROBAN) 2 % ointment Apply topically 2 (two) times a day. 15 g 0     No current facility-administered medications for this visit.      No Known Allergies     Family and Social History   No family history on file.     Social History     Tobacco Use     Smoking status: Never Smoker     Smokeless tobacco: Never Used   Substance Use Topics     Alcohol use: No     Drug use: No        Physical Exam   General Appearance:   Pleasant gentleman in no distress.    /70 (Patient Site: Right Arm, Patient Position: Sitting, Cuff Size: Adult Regular)   Pulse 78   Ht 6' 3.5\" (1.918 m)   Wt (!) 243 lb (110.2 kg)   SpO2 98%   BMI 29.97 kg/m      EYES: Eyelids, conjunctiva, and sclera were normal. Pupils were normal. Cornea, iris, and lens were normal bilaterally.  HEAD, EARS, NOSE, MOUTH, AND THROAT: Head and face were normal. Hearing was normal to voice and the ears were normal to external exam. Nose appearance was normal and there was no discharge.  Left nasal septum with a crusty lesion.  Oropharynx was normal.  NECK: Neck appearance was normal. There were no neck masses and the thyroid was not enlarged.  RESPIRATORY: Breathing pattern was normal and the chest moved symmetrically.  Percussion/auscultatory percussion was normal.  Lung sounds were normal and there were no abnormal sounds.  CARDIOVASCULAR: Heart rate and rhythm were " normal.  S1 and S2 were normal and there were no extra sounds or murmurs. Peripheral pulses in arms and legs were normal.  Jugular venous pressure was normal.  There was no peripheral edema.  GASTROINTESTINAL: The abdomen was normal in contour.  Bowel sounds were present.  Percussion detected no organ enlargement or tenderness.  Palpation detected no tenderness, mass, or enlarged organs.   MUSCULOSKELETAL: Skeletal configuration was normal and muscle mass was normal for age. Joint appearance was overall normal.  LYMPHATIC: There were no enlarged nodes.  SKIN/HAIR/NAILS: Skin color was normal.  There were no skin lesions.  Hair and nails were normal.  NEUROLOGIC: The patient was alert and oriented to person, place, time, and circumstance. Speech was normal. Cranial nerves were normal. Motor strength was normal for age. The patient was normally coordinated.  PSYCHIATRIC:  Mood and affect were normal and the patient had normal recent and remote memory. The patient's judgment and insight were normal.    ADDITIONAL VITAL SIGNS: none  CHEST WALL/BREASTS: nml    RECTAL: Normal tone and normal prostate.  GENITAL/URINARY: Normal penis and testes.     Additional Information        Julian Paris MD  Internal Medicine  Contact me at [phone]

## 2021-07-03 NOTE — ADDENDUM NOTE
Addendum Note by Lary Nava MA at 1/13/2020  8:00 AM     Author: Lary Nava MA Service: -- Author Type: Medical Assistant    Filed: 1/13/2020  8:50 AM Encounter Date: 1/13/2020 Status: Signed    : Lary Nava MA (Medical Assistant)    Addended by: LARY NAVA on: 1/13/2020 08:50 AM        Modules accepted: Orders

## 2022-03-17 ENCOUNTER — OFFICE VISIT (OUTPATIENT)
Dept: INTERNAL MEDICINE | Facility: CLINIC | Age: 47
End: 2022-03-17
Payer: COMMERCIAL

## 2022-03-17 VITALS
HEART RATE: 77 BPM | DIASTOLIC BLOOD PRESSURE: 78 MMHG | SYSTOLIC BLOOD PRESSURE: 122 MMHG | OXYGEN SATURATION: 98 % | BODY MASS INDEX: 28.49 KG/M2 | HEIGHT: 76 IN | TEMPERATURE: 98 F | WEIGHT: 234 LBS

## 2022-03-17 DIAGNOSIS — Z86.16 PERSONAL HISTORY OF COVID-19: ICD-10-CM

## 2022-03-17 DIAGNOSIS — E78.00 PURE HYPERCHOLESTEROLEMIA: ICD-10-CM

## 2022-03-17 DIAGNOSIS — Z11.59 NEED FOR HEPATITIS C SCREENING TEST: ICD-10-CM

## 2022-03-17 DIAGNOSIS — Z00.00 ROUTINE ADULT HEALTH MAINTENANCE: Primary | ICD-10-CM

## 2022-03-17 DIAGNOSIS — C43.59 MALIGNANT MELANOMA OF TORSO EXCLUDING BREAST (H): ICD-10-CM

## 2022-03-17 LAB
ALBUMIN SERPL-MCNC: 4.5 G/DL (ref 3.5–5)
ALP SERPL-CCNC: 59 U/L (ref 45–120)
ALT SERPL W P-5'-P-CCNC: 19 U/L (ref 0–45)
ANION GAP SERPL CALCULATED.3IONS-SCNC: 12 MMOL/L (ref 5–18)
AST SERPL W P-5'-P-CCNC: 24 U/L (ref 0–40)
BILIRUB SERPL-MCNC: 2.2 MG/DL (ref 0–1)
BUN SERPL-MCNC: 10 MG/DL (ref 8–22)
CALCIUM SERPL-MCNC: 9.4 MG/DL (ref 8.5–10.5)
CHLORIDE BLD-SCNC: 100 MMOL/L (ref 98–107)
CHOLEST SERPL-MCNC: 205 MG/DL
CO2 SERPL-SCNC: 27 MMOL/L (ref 22–31)
CREAT SERPL-MCNC: 1.2 MG/DL (ref 0.7–1.3)
FASTING STATUS PATIENT QL REPORTED: YES
GFR SERPL CREATININE-BSD FRML MDRD: 75 ML/MIN/1.73M2
GLUCOSE BLD-MCNC: 90 MG/DL (ref 70–125)
HDLC SERPL-MCNC: 40 MG/DL
LDLC SERPL CALC-MCNC: 140 MG/DL
POTASSIUM BLD-SCNC: 3.7 MMOL/L (ref 3.5–5)
PROT SERPL-MCNC: 7.2 G/DL (ref 6–8)
SODIUM SERPL-SCNC: 139 MMOL/L (ref 136–145)
TRIGL SERPL-MCNC: 123 MG/DL

## 2022-03-17 PROCEDURE — 80053 COMPREHEN METABOLIC PANEL: CPT | Performed by: INTERNAL MEDICINE

## 2022-03-17 PROCEDURE — 86803 HEPATITIS C AB TEST: CPT | Performed by: INTERNAL MEDICINE

## 2022-03-17 PROCEDURE — 99396 PREV VISIT EST AGE 40-64: CPT | Performed by: INTERNAL MEDICINE

## 2022-03-17 PROCEDURE — 80061 LIPID PANEL: CPT | Performed by: INTERNAL MEDICINE

## 2022-03-17 PROCEDURE — 36415 COLL VENOUS BLD VENIPUNCTURE: CPT | Performed by: INTERNAL MEDICINE

## 2022-03-17 NOTE — PROGRESS NOTES
SUBJECTIVE:   CC: Deonte Abraham is an 46 year old male who presents for preventative health visit.     Stan comes in today.  He reports his been doing well.  Denies somatic concerns for me.  Continues to live alone.  Currently not in a relationship.  Dog is getting quite elderly and sickly.  Thinks he is not can make it much longer.  Told plan to get another one anytime soon.  He only has 1 vehicle now.  I dodged pickup which is brand-new.  Work is going well.  His mother is an antivaccine convinced him to not to get a second Covid vaccine.  He did have Covid right after he got his first vaccine.  He denies any other somatic concerns.  Feels well.  Tries to remain active.  He has been doing a lot of woodworking.    Patient has been advised of split billing requirements and indicates understanding: Yes  Healthy Habits:     Getting at least 3 servings of Calcium per day:  NO    Bi-annual eye exam:  NO    Dental care twice a year:  Yes    Sleep apnea or symptoms of sleep apnea:  None    Diet:  Breakfast skipped    Frequency of exercise:  2-3 days/week    Duration of exercise:  15-30 minutes    Taking medications regularly:  Yes    Medication side effects:  Not applicable    PHQ-2 Total Score: 0    Additional concerns today:  No              Today's PHQ-2 Score:   PHQ-2 ( 1999 Pfizer) 3/17/2022   Q1: Little interest or pleasure in doing things 0   Q2: Feeling down, depressed or hopeless 0   PHQ-2 Score 0   Q1: Little interest or pleasure in doing things Not at all   Q2: Feeling down, depressed or hopeless Not at all   PHQ-2 Score 0       Abuse: Current or Past(Physical, Sexual or Emotional)- No  Do you feel safe in your environment? Yes    Have you ever done Advance Care Planning? (For example, a Health Directive, POLST, or a discussion with a medical provider or your loved ones about your wishes): No, advance care planning information given to patient to review.  Patient plans to discuss their wishes with loved ones or  "provider.      Social History     Tobacco Use     Smoking status: Never Smoker     Smokeless tobacco: Never Used   Substance Use Topics     Alcohol use: No     If you drink alcohol do you typically have >3 drinks per day or >7 drinks per week? No    Alcohol Use 3/17/2022   Prescreen: >3 drinks/day or >7 drinks/week? Not Applicable       Last PSA: No results found for: PSA    Reviewed orders with patient. Reviewed health maintenance and updated orders accordingly - Yes      Reviewed and updated as needed this visit by clinical staff   Tobacco  Allergies  Meds              Reviewed and updated as needed this visit by Provider                     Review of Systems   ROS: 10 point ROS neg other than the symptoms noted above in the HPI.    OBJECTIVE:   Ht 1.918 m (6' 3.5\")   Wt 106.1 kg (234 lb)   BMI 28.86 kg/m      Physical Exam  GENERAL: healthy, alert and no distress  EYES: Eyes grossly normal to inspection, PERRL and conjunctivae and sclerae normal  HENT: ear canals and TM's normal, nose and mouth without ulcers or lesions  NECK: no adenopathy, no asymmetry, masses, or scars and thyroid normal to palpation  RESP: lungs clear to auscultation - no rales, rhonchi or wheezes  CV: regular rate and rhythm, normal S1 S2, no S3 or S4, no murmur, click or rub, no peripheral edema and peripheral pulses strong  ABDOMEN: soft, nontender, no hepatosplenomegaly, no masses and bowel sounds normal   (male): normal male genitalia without lesions or urethral discharge, no hernia  MS: no gross musculoskeletal defects noted, no edema  SKIN: no suspicious lesions or rashes  NEURO: Normal strength and tone, mentation intact and speech normal  PSYCH: mentation appears normal, affect normal/bright        ASSESSMENT/PLAN:   1. Routine adult health maintenance  He lives an active and healthy lifestyle.  Eats fairly healthy.  We discussed cutting out some of the junk foods.  Eye exam urged.  Colonoscopy will be done in a couple years.  " "We will start PSA screening at 50.  - Adult Eye Referral; Future  - Lipid panel reflex to direct LDL Fasting; Future  - Comprehensive metabolic panel (BMP + Alb, Alk Phos, ALT, AST, Total. Bili, TP); Future  - Lipid panel reflex to direct LDL Fasting  - Comprehensive metabolic panel (BMP + Alb, Alk Phos, ALT, AST, Total. Bili, TP)    2. Need for hepatitis C screening test    - Hepatitis C Screen Reflex to HCV RNA Quant and Genotype; Future  - Hepatitis C Screen Reflex to HCV RNA Quant and Genotype    3. Malignant melanoma of torso excluding breast (H)  I have urged yearly skin exams with dermatology.    4. Pure hypercholesterolemia    - Lipid panel reflex to direct LDL Fasting; Future  - Lipid panel reflex to direct LDL Fasting    5. Personal history of COVID-19  I reassured him about the safety of COVID-19 vaccinations I urged him to complete his series    Patient has been advised of split billing requirements and indicates understanding: Yes    COUNSELING:   Reviewed preventive health counseling, as reflected in patient instructions    Estimated body mass index is 28.86 kg/m  as calculated from the following:    Height as of this encounter: 1.918 m (6' 3.5\").    Weight as of this encounter: 106.1 kg (234 lb).     Weight management plan: Discussed healthy diet and exercise guidelines    He reports that he has never smoked. He has never used smokeless tobacco.      Counseling Resources:  ATP IV Guidelines  Pooled Cohorts Equation Calculator  FRAX Risk Assessment  ICSI Preventive Guidelines  Dietary Guidelines for Americans, 2010  USDA's MyPlate  ASA Prophylaxis  Lung CA Screening    MATTHIEU EL MD  [facility]  "

## 2022-03-17 NOTE — LETTER
March 22, 2022      Deonte Abraham  [address] [facility] [address]        Dear ,    We are writing to inform you of your test results.    Stan, your labs here look good.  I am glad to see the cholesterol is down.       Resulted Orders   Hepatitis C Screen Reflex to HCV RNA Quant and Genotype   Result Value Ref Range    Hepatitis C Antibody Nonreactive Nonreactive    Narrative    Assay performance characteristics have not been established for newborns, infants, and children.   Lipid panel reflex to direct LDL Fasting   Result Value Ref Range    Cholesterol 205 (H) <=199 mg/dL    Triglycerides 123 <=149 mg/dL    Direct Measure HDL 40 >=40 mg/dL      Comment:      HDL Cholesterol Reference Range:     0-2 years:   No reference ranges established for patients under 2 years old  at [facility] for lipid analytes.    2-8 years:  Greater than 45 mg/dL     18 years and older:   Female: Greater than or equal to 50 mg/dL   Male:   Greater than or equal to 40 mg/dL    LDL Cholesterol Calculated 140 (H) <=129 mg/dL    Patient Fasting > 8hrs? Yes    Comprehensive metabolic panel (BMP + Alb, Alk Phos, ALT, AST, Total. Bili, TP)   Result Value Ref Range    Sodium 139 136 - 145 mmol/L    Potassium 3.7 3.5 - 5.0 mmol/L    Chloride 100 98 - 107 mmol/L    Carbon Dioxide (CO2) 27 22 - 31 mmol/L    Anion Gap 12 5 - 18 mmol/L    Urea Nitrogen 10 8 - 22 mg/dL    Creatinine 1.20 0.70 - 1.30 mg/dL    Calcium 9.4 8.5 - 10.5 mg/dL    Glucose 90 70 - 125 mg/dL    Alkaline Phosphatase 59 45 - 120 U/L    AST 24 0 - 40 U/L    ALT 19 0 - 45 U/L    Protein Total 7.2 6.0 - 8.0 g/dL    Albumin 4.5 3.5 - 5.0 g/dL    Bilirubin Total 2.2 (H) 0.0 - 1.0 mg/dL    GFR Estimate 75 >60 mL/min/1.73m2      Comment:      Effective December 21, 2021 eGFRcr in adults is calculated using the 2021 CKD-EPI creatinine equation which includes age and gender (Isa alvarado al., NEJM, DOI: 10.1056/IZHNcx2744008)       If you have any questions or  concerns, please call the clinic at the number listed above.       Sincerely,      Julian Paris MD

## 2022-03-18 LAB — HCV AB SERPL QL IA: NONREACTIVE

## 2022-09-22 ENCOUNTER — OFFICE VISIT (OUTPATIENT)
Dept: ORTHOPEDICS | Facility: CLINIC | Age: 47
End: 2022-09-22
Payer: COMMERCIAL

## 2022-09-22 VITALS
HEART RATE: 73 BPM | DIASTOLIC BLOOD PRESSURE: 84 MMHG | WEIGHT: 234 LBS | SYSTOLIC BLOOD PRESSURE: 130 MMHG | BODY MASS INDEX: 28.86 KG/M2

## 2022-09-22 DIAGNOSIS — M22.2X1 PATELLOFEMORAL PAIN SYNDROME OF BOTH KNEES: ICD-10-CM

## 2022-09-22 DIAGNOSIS — M22.2X2 PATELLOFEMORAL PAIN SYNDROME OF BOTH KNEES: ICD-10-CM

## 2022-09-22 DIAGNOSIS — M22.40 CHONDROMALACIA OF PATELLA, UNSPECIFIED LATERALITY: ICD-10-CM

## 2022-09-22 DIAGNOSIS — M25.562 CHRONIC PAIN OF BOTH KNEES: Primary | ICD-10-CM

## 2022-09-22 DIAGNOSIS — M25.561 CHRONIC PAIN OF BOTH KNEES: Primary | ICD-10-CM

## 2022-09-22 DIAGNOSIS — G89.29 CHRONIC PAIN OF BOTH KNEES: Primary | ICD-10-CM

## 2022-09-22 PROCEDURE — 99203 OFFICE O/P NEW LOW 30 MIN: CPT | Performed by: PEDIATRICS

## 2022-09-22 NOTE — PATIENT INSTRUCTIONS
Discussed causes of anterior knee pain will start with Home Exercise Program given improvement. They can use a neoprene knee sleeve, ice and OTC medications as needed for pain in the interim.  Low suspicion for internal derangement given current exam, however, would consider further imaging pending clinical course.  - Likely flare of underlying patellofemoral arthritis    Plan:  - Today's Plan of Care:  Discussed activity considerations and other supportive care including Ice/Heat, OTC and other topical medications as needed.  Start with Home Exercise Program    -We also discussed other future treatment options:  Referral to Physical Therapy  MRI if more severe  Consideration of corticosteroid injections    Follow Up: as needed    If you have any further questions for your physician or physician s care team you can call [phone] and use option 3 to leave a voice message.

## 2022-09-22 NOTE — LETTER
9/22/2022         RE: Deonte Abraham  [address] [address]        Dear Colleague,    Thank you for referring your patient, Deonte Abraham, to the [facility]. Please see a copy of my visit note below.    ASSESSMENT & PLAN    Deonte was seen today for pain and pain.    Diagnoses and all orders for this visit:    Chronic pain of both knees  -     XR Knee Standing Bilateral 3 Views; Future    Patellofemoral pain syndrome of both knees    Chondromalacia of patella, unspecified laterality      This issue is acute on chronic and Unchanged.    Discussed causes of anterior knee pain will start with Home Exercise Program given improvement. They can use a neoprene knee sleeve, ice and OTC medications as needed for pain in the interim.  Low suspicion for internal derangement given current exam, however, would consider further imaging pending clinical course.  - Likely flare of underlying patellofemoral arthritis    Plan:  - Today's Plan of Care:  Discussed activity considerations and other supportive care including Ice/Heat, OTC and other topical medications as needed.  Start with Home Exercise Program    -We also discussed other future treatment options:  Referral to Physical Therapy  MRI if more severe  Consideration of corticosteroid injections    Follow Up: as needed    Concerning signs and symptoms were reviewed.  The patient expressed understanding of this management plan and all questions were answered at this time.    Yuli Neal MD [facility]  Sports Medicine Physician  [facility]      -----  Chief Complaint   Patient presents with     Left Knee - Pain     Right Knee - Pain       SUBJECTIVE  Deonte Abraham is a/an 46 year old male who is seen as a self referral for evaluation of bilateral knee pain, left worse than right.  He does indicate he may have injured his left knee 5 weeks ago.    The patient is seen by themselves.    Onset: 5 week(s) ago.  Patient describes injury as kneeling down on the ground (with knee pads on), then after that had swelling, lost ROM, strength.  He was doing a project where he was on his knees.    Location of Pain: bilateral knee pain; left worse than right.  Lateral patella   Worsened by: stairs, knee flexion, squatting   Better with: elevation, icing  Treatments tried: rest/activity avoidance, ice, ibuprofen and casting/splinting/bracing  Associated symptoms: swelling, tingling (in toes, chronic) and clicking/popping    Orthopedic/Surgical history: YES - Date: previously assessed for PFPS  Social History/Occupation: works at a desk; sit/stand    No family history pertinent to patient's problem today.    REVIEW OF SYSTEMS:  Review of Systems  Skin: no bruising, mild swelling  Musculoskeletal: as above  Neurologic: no numbness, paresthesias  Remainder of review of systems is negative including constitutional, CV, pulmonary, GI, except as noted in HPI or medical history.    OBJECTIVE:  /84   Pulse 73   Wt 106.1 kg (234 lb)   BMI 28.86 kg/m     General: healthy, alert and in no distress  HEENT: no scleral icterus or conjunctival erythema  Skin: no suspicious lesions or rash. No jaundice.  CV: distal perfusion intact  Resp: normal respiratory effort without conversational dyspnea   Psych: normal mood and affect  Gait: normal steady gait with appropriate coordination and balance   Neuro: Normal light sensory exam of upper extremity    Bilateral Knee exam    Inspection:      Mild effusion left knee    Patella:      Crepitus noted in the patellofemoral joint bilateral    Tender:      Mild per-patella    Non Tender:      remainder of knee area bilateral    Knee ROM:      Full active and passive ROM with flexion and extension bilateral    Hip ROM:     Full active and passive ROM bilateral    Strength:      5/5 with knee extension bilateral    Special Tests:     neg (-) Sen bilateral       neg (-) anterior drawer bilateral        neg (-) posterior drawer bilateral       neg (-) varus at 0 deg and 30 deg bilateral       neg (-) valgus at 0 deg and 30 deg bilateral    Gait:      Normal    Neurovascular:      2+ peripheral pulses bilaterally and brisk capillary refill       sensation grossly intact    Evaluation of ipsilateral kinetic chain:        decreased strength single leg squat on  left side(s)      RADIOLOGY:  I independently ordered, visualized and reviewed these images with the patient  3 XR views of bilateral knees reviewed: no acute bony abnormality, mild PF degenerative changes  - will follow official read      Review of the result(s) of each unique test - XR             Again, thank you for allowing me to participate in the care of your patient.        Sincerely,        Yuli Neal MD

## 2022-09-22 NOTE — PROGRESS NOTES
ASSESSMENT & PLAN    Deonte was seen today for pain and pain.    Diagnoses and all orders for this visit:    Chronic pain of both knees  -     XR Knee Standing Bilateral 3 Views; Future    Patellofemoral pain syndrome of both knees    Chondromalacia of patella, unspecified laterality      This issue is acute on chronic and Unchanged.    Discussed causes of anterior knee pain will start with Home Exercise Program given improvement. They can use a neoprene knee sleeve, ice and OTC medications as needed for pain in the interim.  Low suspicion for internal derangement given current exam, however, would consider further imaging pending clinical course.  - Likely flare of underlying patellofemoral arthritis    Plan:  - Today's Plan of Care:  Discussed activity considerations and other supportive care including Ice/Heat, OTC and other topical medications as needed.  Start with Home Exercise Program    -We also discussed other future treatment options:  Referral to Physical Therapy  MRI if more severe  Consideration of corticosteroid injections    Follow Up: as needed    Concerning signs and symptoms were reviewed.  The patient expressed understanding of this management plan and all questions were answered at this time.    Yuli Neal MD [facility]  Sports Medicine Physician  [facility]      -----  Chief Complaint   Patient presents with     Left Knee - Pain     Right Knee - Pain       SUBJECTIVE  Deonte Abraham is a/an 46 year old male who is seen as a self referral for evaluation of bilateral knee pain, left worse than right.  He does indicate he may have injured his left knee 5 weeks ago.    The patient is seen by themselves.    Onset: 5 week(s) ago. Patient describes injury as kneeling down on the ground (with knee pads on), then after that had swelling, lost ROM, strength.  He was doing a project where he was on his knees.    Location of Pain: bilateral knee pain; left worse than right.  Lateral patella    Worsened by: stairs, knee flexion, squatting   Better with: elevation, icing  Treatments tried: rest/activity avoidance, ice, ibuprofen and casting/splinting/bracing  Associated symptoms: swelling, tingling (in toes, chronic) and clicking/popping    Orthopedic/Surgical history: YES - Date: previously assessed for PFPS  Social History/Occupation: works at a desk; sit/stand    No family history pertinent to patient's problem today.    REVIEW OF SYSTEMS:  Review of Systems  Skin: no bruising, mild swelling  Musculoskeletal: as above  Neurologic: no numbness, paresthesias  Remainder of review of systems is negative including constitutional, CV, pulmonary, GI, except as noted in HPI or medical history.    OBJECTIVE:  /84   Pulse 73   Wt 106.1 kg (234 lb)   BMI 28.86 kg/m     General: healthy, alert and in no distress  HEENT: no scleral icterus or conjunctival erythema  Skin: no suspicious lesions or rash. No jaundice.  CV: distal perfusion intact  Resp: normal respiratory effort without conversational dyspnea   Psych: normal mood and affect  Gait: normal steady gait with appropriate coordination and balance   Neuro: Normal light sensory exam of upper extremity    Bilateral Knee exam    Inspection:      Mild effusion left knee    Patella:      Crepitus noted in the patellofemoral joint bilateral    Tender:      Mild per-patella    Non Tender:      remainder of knee area bilateral    Knee ROM:      Full active and passive ROM with flexion and extension bilateral    Hip ROM:     Full active and passive ROM bilateral    Strength:      5/5 with knee extension bilateral    Special Tests:     neg (-) Sen bilateral       neg (-) anterior drawer bilateral       neg (-) posterior drawer bilateral       neg (-) varus at 0 deg and 30 deg bilateral       neg (-) valgus at 0 deg and 30 deg bilateral    Gait:      Normal    Neurovascular:      2+ peripheral pulses bilaterally and brisk capillary refill       sensation  grossly intact    Evaluation of ipsilateral kinetic chain:        decreased strength single leg squat on  left side(s)      RADIOLOGY:  I independently ordered, visualized and reviewed these images with the patient  3 XR views of bilateral knees reviewed: no acute bony abnormality, mild PF degenerative changes  - will follow official read      Review of the result(s) of each unique test - XR

## 2022-12-22 ENCOUNTER — TRANSFERRED RECORDS (OUTPATIENT)
Dept: HEALTH INFORMATION MANAGEMENT | Facility: CLINIC | Age: 47
End: 2022-12-22

## 2023-04-06 ENCOUNTER — DOCUMENTATION ONLY (OUTPATIENT)
Dept: VASCULAR SURGERY | Facility: CLINIC | Age: 48
End: 2023-04-06

## 2023-04-06 ENCOUNTER — OFFICE VISIT (OUTPATIENT)
Dept: INTERNAL MEDICINE | Facility: CLINIC | Age: 48
End: 2023-04-06
Payer: COMMERCIAL

## 2023-04-06 VITALS
HEART RATE: 73 BPM | SYSTOLIC BLOOD PRESSURE: 118 MMHG | RESPIRATION RATE: 10 BRPM | DIASTOLIC BLOOD PRESSURE: 70 MMHG | HEIGHT: 76 IN | OXYGEN SATURATION: 99 % | BODY MASS INDEX: 28.06 KG/M2 | WEIGHT: 230.4 LBS | TEMPERATURE: 97.9 F

## 2023-04-06 DIAGNOSIS — G89.29 CHRONIC PAIN OF LEFT KNEE: ICD-10-CM

## 2023-04-06 DIAGNOSIS — R20.2 NUMBNESS AND TINGLING OF LEFT LOWER EXTREMITY: ICD-10-CM

## 2023-04-06 DIAGNOSIS — R20.0 NUMBNESS AND TINGLING OF LEFT LOWER EXTREMITY: ICD-10-CM

## 2023-04-06 DIAGNOSIS — M79.89 LEFT LEG SWELLING: Primary | ICD-10-CM

## 2023-04-06 DIAGNOSIS — M25.562 CHRONIC PAIN OF LEFT KNEE: ICD-10-CM

## 2023-04-06 DIAGNOSIS — I83.813 VARICOSE VEINS OF BOTH LOWER EXTREMITIES WITH PAIN: ICD-10-CM

## 2023-04-06 PROCEDURE — 99214 OFFICE O/P EST MOD 30 MIN: CPT | Performed by: INTERNAL MEDICINE

## 2023-04-06 NOTE — PROGRESS NOTES
Vascular Referral Intake    Referred by: Julian Paris MD for varicose veins of both lower extremities and left leg swelling    Specialty: Vein Clinic    Specific Provider if Necessary:  MD Ad Roger or HUMBERTO Vaz    Visit Type: Vein Clinic    Time Frame: Next Available    Testing/Imaging Needed Before Consult: None    Appt Note: New Varicose veins bilateral and left leg swelling. Had duplex to r/o DVT. Recommended compression.

## 2023-04-06 NOTE — PROGRESS NOTES
1. Left leg swelling  I did discuss with patient that the knee discomfort and the leg swelling are likely unrelated.  I have recommended ultrasound to rule out DVT.  We will have him meet with vascular and vein clinic to discuss compression and or procedures to ablate the varicosities.  - US Lower Extremity Venous Duplex Bilateral; Future  - Vascular Surgery Referral; Future    2. Numbness and tingling of left lower extremity  Likely due to the swelling.  I doubt if he has a secondary neuropathy caused by something else.  I am going to recommend that we do labs at our physical next month though  - US Lower Extremity Venous Duplex Bilateral; Future  - Vascular Surgery Referral; Future    3. Chronic pain of left knee  If this becomes more of an issue I recommend seeing orthopedics    4. Varicose veins of both lower extremities with pain  Obvious varicosities.  Likely would benefit from compression.  We will have him meet with vascular and vein.  - US Lower Extremity Venous Duplex Bilateral; Future  - Vascular Surgery Referral; Future    Subjective   Deonte is a 47 year old, presenting for the following health issues:  Recheck Medication and Musculoskeletal Problem (PT REPORTS SWOLLEN ANKLE AND KNEE PAIN ON LEFT SIDE WITH TINGLING IN TOES SINCE LAST SUMMER)        4/6/2023     7:17 AM   Additional Questions   Roomed by AW   Accompanied by ALONE     Musculoskeletal Problem    History of Present Illness       Reason for visit:  Knee pain  Symptom onset:  More than a month  Symptoms include:  Pain swelling  Symptom intensity:  Mild  Symptom progression:  Staying the same  Had these symptoms before:  No  What makes it worse:  Activity stairs  What makes it better:  Rest    He eats 0-1 servings of fruits and vegetables daily.He consumes 0 sweetened beverage(s) daily.He exercises with enough effort to increase his heart rate 9 or less minutes per day.  He exercises with enough effort to increase his heart rate 3 or less  "days per week.   He is taking medications regularly.       Stan comes in today as an urgent add-on for evaluation of what was originally scheduled as knee pain in actuality its not really knee that is bothering him at this point.  What is bothering him more at this point is left leg swelling numbness and tingling.  Last fall he developed some knee pain bilaterally and went to see a sports medicine provider.  They did x-rays.  Told him to do some therapy.  That does not bother him that much anymore but what does bother him is left leg swelling.  His skin is turning colors distally above the ankle.  As the day goes on he gets more more swollen.  In the morning gets better.  Mother had varicose veins.        Review of Systems         Objective    /70 (BP Location: Left arm, Patient Position: Sitting, Cuff Size: Adult Large)   Pulse 73   Temp 97.9  F (36.6  C) (Tympanic)   Resp 10   Ht 1.918 m (6' 3.5\")   Wt 104.5 kg (230 lb 6.4 oz)   SpO2 99%   BMI 28.42 kg/m    Body mass index is 28.42 kg/m .  Physical Exam       Pleasant gentleman with varicosities bilaterally and some mild chronic venous stasis changes to the skin.  Normal sensation to light touch throughout on the left lower extremity.                "

## 2023-04-19 ENCOUNTER — OFFICE VISIT (OUTPATIENT)
Dept: VASCULAR SURGERY | Facility: CLINIC | Age: 48
End: 2023-04-19
Attending: INTERNAL MEDICINE
Payer: COMMERCIAL

## 2023-04-19 ENCOUNTER — ANCILLARY PROCEDURE (OUTPATIENT)
Dept: VASCULAR ULTRASOUND | Facility: CLINIC | Age: 48
End: 2023-04-19
Attending: INTERNAL MEDICINE
Payer: COMMERCIAL

## 2023-04-19 VITALS
TEMPERATURE: 98.5 F | SYSTOLIC BLOOD PRESSURE: 110 MMHG | RESPIRATION RATE: 12 BRPM | HEART RATE: 76 BPM | DIASTOLIC BLOOD PRESSURE: 70 MMHG

## 2023-04-19 DIAGNOSIS — R20.0 NUMBNESS AND TINGLING OF LEFT LOWER EXTREMITY: ICD-10-CM

## 2023-04-19 DIAGNOSIS — R20.2 NUMBNESS AND TINGLING OF LEFT LOWER EXTREMITY: ICD-10-CM

## 2023-04-19 DIAGNOSIS — I83.813 VARICOSE VEINS OF BOTH LOWER EXTREMITIES WITH PAIN: ICD-10-CM

## 2023-04-19 DIAGNOSIS — M79.89 LEFT LEG SWELLING: ICD-10-CM

## 2023-04-19 PROCEDURE — 99243 OFF/OP CNSLTJ NEW/EST LOW 30: CPT | Performed by: SPECIALIST

## 2023-04-19 PROCEDURE — 93970 EXTREMITY STUDY: CPT

## 2023-04-19 PROCEDURE — 99213 OFFICE O/P EST LOW 20 MIN: CPT | Mod: 25 | Performed by: SPECIALIST

## 2023-04-19 ASSESSMENT — PAIN SCALES - GENERAL: PAINLEVEL: NO PAIN (1)

## 2023-04-19 NOTE — PROGRESS NOTES
[facility]        Patient is here for a consult to discuss Varicose vein(s). The patient has varicose veins that are problematic in bilateral legs. Symptoms patient has been experiencing are heaviness and  swelling. Patient states their varicose veins are bothersome when standing, sitting and  working. Patient has not been wearing compression stockings. Patient has not been using pain medication or anti-inflammatory's. Patient  has and has not had recent imaging on legs done. US reviewed right SSV and left GSV RFA options, Advised to start conservative treatment of elevation, compression socks and exercise.RTC in 3 months.    Pt is currently taking no meds that would impact our treatment plan.    /70   Pulse 76   Temp 98.5  F (36.9  C)   Resp 12           Richardson Hutchins RN

## 2023-04-19 NOTE — PATIENT INSTRUCTIONS
Deonte     Thank you for entrusting your care with us at the [facility].     We are prescribing some compression stockings for you. I have included different suppliers that should help you get measured and fitting to ensure proper fitting socks. You should wear these stockings as much as you can. It is especially important to wear them with long periods of standing, sitting, long car rides or if you will be flying. Compression socks should get refilled every 4-6 months. They do not need to be worn at night while in bed. It is recommended to wear compression level of 20-30mmhg or higher from toes to knees.     We will perform an ultrasound today or prior to your appointment with us in 3 months time to evaluate the valves in your veins.     We would like you to follow up in 3 months after wearing socks to see how you are doing.    Varicose Veins      Varicose veins are swollen, enlarged veins most often found in the legs. They are usually blue or purple in color and may bulge, twist, and stand out under the skin.  Normally, veins return blood from the body to the heart. The leg veins have one-way valves that prevent blood from flowing backward in the vein. When the valves are weak or damaged, blood backs up in the veins. This may cause some of the veins to swell and bulge and become varicose veins.      Symptoms    Varicose veins may or may not cause symptoms. If symptoms do occur, they can include:  Legs that feel tired, achy, heavy, or itchy  Leg muscle cramps  Skin changes, such as discoloration, dryness, redness, or rash (in more severe cases, you may also have sores on the skin called venous leg ulcers)    Pain & Discomfort  Pain, itching, swelling, burning, leg heaviness or tiredness, skin discoloration. Symptoms typically worsen throughout the day and are partially relieved by elevation or wearing compression socks or stockings.    Sometimes, varicose veins clot and become painful,  hot, hard and discolored. This is called phlebitis, an uncomfortable but temporary condition that will get better on its own in 2-3 months. Clots associated with phlebitis are limited to surface veins, and not dangerous - unlike clots in the deep veins (deep vein thrombosis or DVT) that are dangerous because they can travel to the heart or lung and require prompt treatment with blood thinners.    Bleeding  A shower or minor trauma can cause a varicose vein to burst and bleed.    Risk Factors    There are a number of factors that increase the risk for varicose veins. These can include:  Being a woman  Being older  Sitting or standing for long periods  Being overweight  Being pregnant  Having a family history of varicose veins    Among other things, veins are responsible for bringing blood back to the heart, sometimes working against gravity. When you walk, muscles in your leg squeeze the veins and help blood flow back into the heart. In normal veins, a series of valves assist this process. With varicose veins and with a related condition called chronic venous insufficiency, poorly functioning valves allow the blood to pool in the lower leg and cause symptoms.     Treatment begins with simple self-help measures (see below). If these don t help, there are many procedures that can be done to shrink or remove varicose veins. Your healthcare provider can tell you more about these options, if needed.    Home care  Support or compression stockings will likely be prescribed. If so, be sure to wear them as directed. They may help improve blood flow.  Exercising helps strengthen your leg muscles and improve blood flow. To get the most benefit, choose exercises such as walking, swimming, or cycling. Also try to exercise for at least 30 minutes on most days.  Raising (elevating) your legs lets gravity help blood flow back to the heart. Sit or lie with your feet above heart level a few times throughout the day, or as  directed.  Avoid long periods of sitting or standing. Change positions often. Also, move your ankles, toes and knees often. This may also help improve blood flow.  If you are overweight, talk with your healthcare provider about setting up a weight-loss plan. Maintaining a healthy weight can help reduce the strain on your veins. It may also improve symptoms, such as swelling and aching.  If you have dryness and itching, ask your provider about special lotions that can be applied to the skin to help improve symptoms.    When to seek medical advice  Call your healthcare provider right away if any of these occur:  Sudden, severe leg swelling, pain, or redness  Symptoms worsen, or they don t improve with self-care  Bleeding from any affected veins  Ulcers form on the legs, ankles, or feet  Fever of 100.4 F (38 C) or higher, or as advised by your provider      Understanding Spider and Varicose Veins    Do you often hide your legs because of the way they look? You may have noticed tiny red or blue bursts (spider veins). Or maybe you have veins that bulge or look twisted (varicose veins). If so, there are treatments that can help    What are the symptoms?  Spider veins or varicose veins may never be a problem. But sometimes they can cause legs to ache or swell. Your legs may also feel heavy and tired, or like they re burning. These symptoms may be more severe at the end of the day. Prolonged sitting or standing can also make your symptoms worse.    Who gets spider and varicose veins?  Anyone can get spider or varicose veins. But vein problems tend to be hereditary (run in families). Other factors that can affect veins include:  Pregnancy, hormones, and birth control pills  A job where you stand or sit a lot  Extra weight or lack of exercise  Age                       What can be done?  Spider and varicose veins can affect the way you feel about yourself. Talk to your healthcare provider about your concerns. There are  treatments that can ease symptoms and make your legs look better.    Your treatment choices  Treatment may include self-care, sclerotherapy (injecting veins with a chemical), surgery, or newer nonsurgical minimally invasive therapies. Spider veins and some varicose veins can be treated with sclerotherapy. Large varicose veins can often be treated with newer minimally invasive procedures and, in rare cases, surgery may be needed.     Please bring your prescription to a home medical supply store. Here is a list of locations but not limited to.     [facility]  [address] [place] [address]  Phone: [phone]  Fax: [phone] [facility] Bld[facility]  [address] [place] [address]  Phone: [phone]  Fax: [phone]   [facility]  [address] [place] [address]  Phone: [phone]  Fax: [phone] [facility]  [address]  [address] [place] [address]  Phone: [phone]  Fax: [phone]   [facility]  [address] [place] [address]  Phone: [phone] [facility]   [address] ([facility] [place] [address]  Phone: [phone]   [place]  [address] [place] [address]      Phone: [phone]  Fax: [phone] [place]  [address] [place] [address]      Phone: [phone]  Fax: [phone]     [facility] [employer] [email or web address]  [address], [place] [address]  [phone]    [facility]  [email or web address]  [address] [place] [address]  Phone: [phone]     [address] [place] [address]  Phone: [phone]    [address] [facility] [address] [place]  [address]  Phone: [phone] [address], [place] [address]  Phone: [phone] [address], [place] [address]  Phone: [phone]    [facility] [email or web address]  [address] [place] [address]  Phone: [phone] [address], [place] [address]  Phone: [phone] [address] [place] [address]  Phone: [phone]    [facility]  [email or web address]  [phone]            Reference: [employer] and SVS- Dr Bridger Denney: [email or web address]-veins#resource-185      If you have any questions or problems prior to us seeing you at your next scheduled visit please do not hesitate to call us at [phone].    Dr Ad Roger MD & Richardson DOAN RN

## 2023-04-19 NOTE — PROGRESS NOTES
[facility] Vein Consult      Assessment:     1. varicose veins, bilateral   2. spider veins, bilateral   3. Insuffiencey of left greater saphenous vein,  right short saphenous vein    Plan:     1. Treatment options of conservative therapy of stockings use, exercise, weight loss, elevating legs when possible.    2. Script for compression stockings 20-30 mm hg  3. Ultrasound to evaluate legs for incompetency of both deep and superficial system .   4. Surgical treatment, discussed briefly today options  5. Follow up: 3 months.   6. Call for any questions concerns or issues    Subjective:      Deonte Abraham is a 47 year old male  who was referred by Julian Paris  for evaluation of varicose veins. Symptoms include pain, aching, fatigue, burning, edema and dermatitis. Patient has history of leg selling, pain and vein issues that have progressed. Pain and symptoms have affected daily living and work activities needing medications. Here for evaluation today. no stocking or compression devic use    Allergies:Patient has no known allergies.    History reviewed. No pertinent past medical history.    History reviewed. No pertinent surgical history.      Current Outpatient Medications:      ascorbic acid, vitamin C, (ASCORBIC ACID WITH ALLIE HIPS) 500 MG tablet, [ASCORBIC ACID, VITAMIN C, (ASCORBIC ACID WITH ALLIE HIPS) 500 MG TABLET] Take 500 mg by mouth daily., Disp: , Rfl:      cholecalciferol, vitamin D3, (VITAMIN D3) 1,000 unit capsule, [CHOLECALCIFEROL, VITAMIN D3, (VITAMIN D3) 1,000 UNIT CAPSULE] Take 1,000 Units by mouth daily., Disp: , Rfl:      DOCOSAHEXANOIC ACID/EPA (FISH OIL ORAL), [DOCOSAHEXANOIC ACID/EPA (FISH OIL ORAL)] Take 1 capsule by mouth daily., Disp: , Rfl:      MULTIVITAMIN ORAL, [MULTIVITAMIN ORAL] Take 1 tablet by mouth daily., Disp: , Rfl:      History reviewed. No pertinent family history.     reports that he has never smoked. He has never used smokeless tobacco. He reports that he does not  drink alcohol and does not use drugs.      Review of Systems:    Pertinent items are noted in HPI.  Patient has symptomatic veins and changes of bilateral legs. These have progressed to the point of causing symptoms on a daily basis. This causes issues with daily activities and chores such as mowing lawn, outdoor upkeep and standing for long lengths of time       Objective:     There were no vitals filed for this visit.  There is no height or weight on file to calculate BMI.    EXAM:  GENERAL: This is a well-developed 47 year old male who appears his stated age  HEAD: normocephalic  HEENT: Pupils equal and reactive bilaterally  MOUTH: mucus membranes intact. Normal dentation  CARDIAC: RRR without murmur  CHEST/LUNG:  Clear to auscultation bilaterally  ABDOMEN: Soft, nontender, nondistended, no masses noted   NEUROLOGIC: Focally intact, nonfocal, alert and oriented x 3  INTEGUMENT: No open lesions or ulcers  VASCULAR: Pulses intact, symmetrical upper and lower extremities. There areskin changes consistent with chronic venous insufficiency. Varicose veins present in bilateral greater saphenous distribution. Spider veins present bilateral.    VCSS 5  CEAP C3       Imaging:    Order  US Venous Competency Bilateral [[id number]] (Order [id number])  Exam Information    Accession # Performing Department Results    [id number] [facility] [place]         Study Result    Narrative & Impression   BILATERAL Venous Insufficiency Ultrasound (Date: 04/19/23)    BILATERAL Lower Extremity          Indication:  Surveillance Bilateral Symptomatic Varicose Veins, Pain, and Swelling. More in Left leg.      Previous: None     Patient History: Swelling     Presenting Symptoms:  Swelling, Varicose Veins and Pain     Technique:   Supine and Upright Ultrasound of the Deep and Superficial Veins with Valsalva and Compression Augmentation Maneuvers. Duplex Imaging is performed utilizing gray-scale,  Two-dimensional images, color-flow imaging, Doppler waveform analysis, and Spectral doppler imaging done with provacative maneuvers.      Incompetency Criteria:  Deep vein reflux reported when greater than 1000 msec flow reversal. Superficial vein reflux reported when equal to or greater than 600 msec flow reversal.  vein reflux reported as greater than 350 msec flow reversal.      Right  Leg Deep Veins    CFV SFJ PFV PROX FV   PROX FV MID FV DIST POP V. PERON.   V. PTV'S   Compressibility  (FC,PC,NC) FC FC FC FC FC FC FC FC FC   Reflux -   - - - - -   -         Right Leg Saphenous Veins  Location SFJ PROX THIGH KNEE MID CALF SPJ PROX CALF MID CALF   RT GSV (mm) 4.6 5.2 3.9 2.9         Reflux - - - -         RT SSV (mm)         5.7 3.7 3.4   Reflux         + + -      Location SFJ   RT AASV (mm) 4.5   Reflux -      Left Leg Deep Veins    CFV SFJ PFV PROX SFV PROX SFV MID SFV DIST POP V. PERON. V. PTV'S   Compressibility  (FC,PC,NC) FC FC FC FC FC FC FC FC FC   Reflux +   - - - - -   -         Lt Leg Saphenous Veins   Location SFJ PROX THIGH KNEE MID CALF SPJ PROX CALF MID CALF   LT GSV (mm) 11.3 10.5 8.3 3.5         Reflux + + + -         LT SSV (mm)         3.5 3.5 2.3   Reflux         - - -         Comments:  . Right GSV Ankle Varicose Vein Branch Incompetent ( 4.2mm/ 2927 ms). Left GSV Knee Varicose Vein Branch Anterior that connects to Tortuous Varicosities at Proximal Calf is Incompetent ( 7.9 mm / 3440 ms).   Left incompetent  veins visualized at Left GSV at Ankle ( 4.5 mm /3598 ms).     Impression:         Reference:     Compressibility: FC= Fully compressible, PC= Partially compressible, NC= Non-compressible, NV= Not Visualized     Reflux: (+) Incompetent  (-) Competent, (NV) = Not Visualized     Interpretation criteria:          Duration of Retrograde flow (milliseconds)  Category Deep Veins Superficial Veins  veins   Competent < 1000ms < 500ms < 350ms   Incompetent > 1000ms >  500ms > 350ms             Ad Roger MD  General Surgery [phone]  Vascular Surgery [phone]

## 2023-05-10 ENCOUNTER — OFFICE VISIT (OUTPATIENT)
Dept: INTERNAL MEDICINE | Facility: CLINIC | Age: 48
End: 2023-05-10
Payer: COMMERCIAL

## 2023-05-10 VITALS
SYSTOLIC BLOOD PRESSURE: 118 MMHG | RESPIRATION RATE: 16 BRPM | BODY MASS INDEX: 27.76 KG/M2 | TEMPERATURE: 98.2 F | DIASTOLIC BLOOD PRESSURE: 72 MMHG | WEIGHT: 228 LBS | HEART RATE: 70 BPM | HEIGHT: 76 IN | OXYGEN SATURATION: 98 %

## 2023-05-10 DIAGNOSIS — H61.23 BILATERAL IMPACTED CERUMEN: ICD-10-CM

## 2023-05-10 DIAGNOSIS — Z85.820 HISTORY OF MELANOMA: ICD-10-CM

## 2023-05-10 DIAGNOSIS — M25.562 CHRONIC PAIN OF LEFT KNEE: ICD-10-CM

## 2023-05-10 DIAGNOSIS — R20.2 NUMBNESS AND TINGLING OF LEFT LOWER EXTREMITY: ICD-10-CM

## 2023-05-10 DIAGNOSIS — E78.00 PURE HYPERCHOLESTEROLEMIA: ICD-10-CM

## 2023-05-10 DIAGNOSIS — G89.29 CHRONIC PAIN OF LEFT KNEE: ICD-10-CM

## 2023-05-10 DIAGNOSIS — Z00.00 ROUTINE ADULT HEALTH MAINTENANCE: Primary | ICD-10-CM

## 2023-05-10 DIAGNOSIS — I83.813 VARICOSE VEINS OF BOTH LOWER EXTREMITIES WITH PAIN: ICD-10-CM

## 2023-05-10 DIAGNOSIS — R20.0 NUMBNESS AND TINGLING OF LEFT LOWER EXTREMITY: ICD-10-CM

## 2023-05-10 LAB
ALBUMIN SERPL BCG-MCNC: 4.8 G/DL (ref 3.5–5.2)
ALBUMIN UR-MCNC: NEGATIVE MG/DL
ALP SERPL-CCNC: 64 U/L (ref 40–129)
ALT SERPL W P-5'-P-CCNC: 18 U/L (ref 10–50)
ANION GAP SERPL CALCULATED.3IONS-SCNC: 14 MMOL/L (ref 7–15)
APPEARANCE UR: CLEAR
AST SERPL W P-5'-P-CCNC: 26 U/L (ref 10–50)
BILIRUB SERPL-MCNC: 1.4 MG/DL
BILIRUB UR QL STRIP: NEGATIVE
BUN SERPL-MCNC: 12.9 MG/DL (ref 6–20)
CALCIUM SERPL-MCNC: 9.5 MG/DL (ref 8.6–10)
CHLORIDE SERPL-SCNC: 100 MMOL/L (ref 98–107)
CHOLEST SERPL-MCNC: 204 MG/DL
COLOR UR AUTO: YELLOW
CREAT SERPL-MCNC: 1.17 MG/DL (ref 0.67–1.17)
DEPRECATED HCO3 PLAS-SCNC: 25 MMOL/L (ref 22–29)
ERYTHROCYTE [DISTWIDTH] IN BLOOD BY AUTOMATED COUNT: 12.3 % (ref 10–15)
GFR SERPL CREATININE-BSD FRML MDRD: 76 ML/MIN/1.73M2
GLUCOSE SERPL-MCNC: 89 MG/DL (ref 70–99)
GLUCOSE UR STRIP-MCNC: NEGATIVE MG/DL
HCT VFR BLD AUTO: 43.7 % (ref 40–53)
HDLC SERPL-MCNC: 42 MG/DL
HGB BLD-MCNC: 14.6 G/DL (ref 13.3–17.7)
HGB UR QL STRIP: NEGATIVE
KETONES UR STRIP-MCNC: NEGATIVE MG/DL
LDLC SERPL CALC-MCNC: 134 MG/DL
LEUKOCYTE ESTERASE UR QL STRIP: NEGATIVE
MCH RBC QN AUTO: 29.9 PG (ref 26.5–33)
MCHC RBC AUTO-ENTMCNC: 33.4 G/DL (ref 31.5–36.5)
MCV RBC AUTO: 89 FL (ref 78–100)
NITRATE UR QL: NEGATIVE
NONHDLC SERPL-MCNC: 162 MG/DL
PH UR STRIP: 5.5 [PH] (ref 5–8)
PLATELET # BLD AUTO: 168 10E3/UL (ref 150–450)
POTASSIUM SERPL-SCNC: 4 MMOL/L (ref 3.4–5.3)
PROT SERPL-MCNC: 7.5 G/DL (ref 6.4–8.3)
RBC # BLD AUTO: 4.89 10E6/UL (ref 4.4–5.9)
SODIUM SERPL-SCNC: 139 MMOL/L (ref 136–145)
SP GR UR STRIP: 1.01 (ref 1–1.03)
TRIGL SERPL-MCNC: 142 MG/DL
TSH SERPL DL<=0.005 MIU/L-ACNC: 1.64 UIU/ML (ref 0.3–4.2)
UROBILINOGEN UR STRIP-ACNC: 0.2 E.U./DL
VIT B12 SERPL-MCNC: 586 PG/ML (ref 232–1245)
WBC # BLD AUTO: 6.4 10E3/UL (ref 4–11)

## 2023-05-10 PROCEDURE — 84443 ASSAY THYROID STIM HORMONE: CPT | Performed by: INTERNAL MEDICINE

## 2023-05-10 PROCEDURE — 99214 OFFICE O/P EST MOD 30 MIN: CPT | Mod: 25 | Performed by: INTERNAL MEDICINE

## 2023-05-10 PROCEDURE — 82607 VITAMIN B-12: CPT | Performed by: INTERNAL MEDICINE

## 2023-05-10 PROCEDURE — 80061 LIPID PANEL: CPT | Performed by: INTERNAL MEDICINE

## 2023-05-10 PROCEDURE — 36415 COLL VENOUS BLD VENIPUNCTURE: CPT | Performed by: INTERNAL MEDICINE

## 2023-05-10 PROCEDURE — 81003 URINALYSIS AUTO W/O SCOPE: CPT | Performed by: INTERNAL MEDICINE

## 2023-05-10 PROCEDURE — 80053 COMPREHEN METABOLIC PANEL: CPT | Performed by: INTERNAL MEDICINE

## 2023-05-10 PROCEDURE — 99396 PREV VISIT EST AGE 40-64: CPT | Mod: 25 | Performed by: INTERNAL MEDICINE

## 2023-05-10 PROCEDURE — 85027 COMPLETE CBC AUTOMATED: CPT | Performed by: INTERNAL MEDICINE

## 2023-05-10 ASSESSMENT — ENCOUNTER SYMPTOMS
HEMATURIA: 0
ARTHRALGIAS: 0
COUGH: 0
WEAKNESS: 0
CHILLS: 0
HEMATOCHEZIA: 0
FEVER: 0
DIARRHEA: 0
JOINT SWELLING: 0
CONSTIPATION: 0
DIZZINESS: 0
MYALGIAS: 0
SHORTNESS OF BREATH: 0
ABDOMINAL PAIN: 0
SORE THROAT: 0
FREQUENCY: 0
PARESTHESIAS: 0
DYSURIA: 0
HEARTBURN: 0
NAUSEA: 0
EYE PAIN: 0
HEADACHES: 0
PALPITATIONS: 0
NERVOUS/ANXIOUS: 0

## 2023-05-10 NOTE — LETTER
May 15, 2023      Deonte Abraham  [address] [address]        Dear ,    We are writing to inform you of your test results.        Resulted Orders   Lipid panel reflex to direct LDL Fasting   Result Value Ref Range    Cholesterol 204 (H) <200 mg/dL    Triglycerides 142 <150 mg/dL    Direct Measure HDL 42 >=40 mg/dL    LDL Cholesterol Calculated 134 (H) <=100 mg/dL    Non HDL Cholesterol 162 (H) <130 mg/dL    Narrative    Cholesterol  Desirable:  <200 mg/dL    Triglycerides  Normal:  Less than 150 mg/dL  Borderline High:  150-199 mg/dL  High:  200-499 mg/dL  Very High:  Greater than or equal to 500 mg/dL    Direct Measure HDL  Female:  Greater than or equal to 50 mg/dL   Male:  Greater than or equal to 40 mg/dL    LDL Cholesterol  Desirable:  <100mg/dL  Above Desirable:  100-129 mg/dL   Borderline High:  130-159 mg/dL   High:  160-189 mg/dL   Very High:  >= 190 mg/dL    Non HDL Cholesterol  Desirable:  130 mg/dL  Above Desirable:  130-159 mg/dL  Borderline High:  160-189 mg/dL  High:  190-219 mg/dL  Very High:  Greater than or equal to 220 mg/dL   Comprehensive metabolic panel (BMP + Alb, Alk Phos, ALT, AST, Total. Bili, TP)   Result Value Ref Range    Sodium 139 136 - 145 mmol/L    Potassium 4.0 3.4 - 5.3 mmol/L    Chloride 100 98 - 107 mmol/L    Carbon Dioxide (CO2) 25 22 - 29 mmol/L    Anion Gap 14 7 - 15 mmol/L    Urea Nitrogen 12.9 6.0 - 20.0 mg/dL    Creatinine 1.17 0.67 - 1.17 mg/dL    Calcium 9.5 8.6 - 10.0 mg/dL    Glucose 89 70 - 99 mg/dL    Alkaline Phosphatase 64 40 - 129 U/L    AST 26 10 - 50 U/L    ALT 18 10 - 50 U/L    Protein Total 7.5 6.4 - 8.3 g/dL    Albumin 4.8 3.5 - 5.2 g/dL    Bilirubin Total 1.4 (H) <=1.2 mg/dL    GFR Estimate 76 >60 mL/min/1.73m2      Comment:      eGFR calculated using 2021 CKD-EPI equation.   UA Macroscopic with reflex to Microscopic and Culture   Result Value Ref Range    Color Urine Yellow Colorless, Straw, Light Yellow, Yellow    Appearance Urine  Clear Clear    Glucose Urine Negative Negative mg/dL    Bilirubin Urine Negative Negative    Ketones Urine Negative Negative mg/dL    Specific Gravity Urine 1.010 1.005 - 1.030    Blood Urine Negative Negative    pH Urine 5.5 5.0 - 8.0    Protein Albumin Urine Negative Negative mg/dL    Urobilinogen Urine 0.2 0.2, 1.0 E.U./dL    Nitrite Urine Negative Negative    Leukocyte Esterase Urine Negative Negative    Narrative    Microscopic not indicated   CBC with platelets   Result Value Ref Range    WBC Count 6.4 4.0 - 11.0 10e3/uL    RBC Count 4.89 4.40 - 5.90 10e6/uL    Hemoglobin 14.6 13.3 - 17.7 g/dL    Hematocrit 43.7 40.0 - 53.0 %    MCV 89 78 - 100 fL    MCH 29.9 26.5 - 33.0 pg    MCHC 33.4 31.5 - 36.5 g/dL    RDW 12.3 10.0 - 15.0 %    Platelet Count 168 150 - 450 10e3/uL   TSH with free T4 reflex   Result Value Ref Range    TSH 1.64 0.30 - 4.20 uIU/mL   Vitamin B12   Result Value Ref Range    Vitamin B12 586 232 - 1,245 pg/mL       If you have any questions or concerns, please call the clinic at the number listed above.       Sincerely,      Julian Paris MD

## 2023-05-10 NOTE — PROGRESS NOTES
SUBJECTIVE:   CC: Deonte is an 47 year old who presents for preventative health visit.       5/10/2023     2:06 PM   Additional Questions   Roomed by Lary   Patient has been advised of split billing requirements and indicates understanding: Yes  HPI    Stan comes in a for physical.  He reports he now is wearing compression stockings.  He thinks it is going to make a difference.  He will follow-up with the vascular group later this summer.  History of melanoma remotely.  Sees the dermatologist regularly.  He notes decreased hearing.  He still gets intermittent numbness and tingling of his left lower extremity.  He still has intermittent left knee pain.  Somewhat told him a couple years ago that he should do exercises but he never did them.  His dog is nearing death.  He is states he is going to have a hard time because it has just been the 2 of them for the last 18 years.  He does not want to get a dog right away so he can do some traveling.  Otherwise he is doing okay.  He is currently not dating    He is really getting and do his woodworking.  He is making garden beds out of [place] fencing.  He is also making workbench is      Today's PHQ-2 Score:       5/10/2023     1:46 PM   PHQ-2 ( 1999 [employer])   Q1: Little interest or pleasure in doing things 0   Q2: Feeling down, depressed or hopeless 0   PHQ-2 Score 0   Q1: Little interest or pleasure in doing things Not at all   Q2: Feeling down, depressed or hopeless Not at all   PHQ-2 Score 0           Social History     Tobacco Use     Smoking status: Never     Smokeless tobacco: Never   Vaping Use     Vaping status: Not on file   Substance Use Topics     Alcohol use: No             5/10/2023     1:45 PM   Alcohol Use   Prescreen: >3 drinks/day or >7 drinks/week? Not Applicable       Last PSA: No results found for: PSA    Reviewed orders with patient. Reviewed health maintenance and updated orders accordingly -       Reviewed and updated as needed this visit by clinical  "staff   Tobacco  Allergies  Meds              Reviewed and updated as needed this visit by Provider                     Review of Systems   ROS: 10 point ROS neg other than the symptoms noted above in the HPI.    OBJECTIVE:   /72 (BP Location: Left arm, Patient Position: Sitting, Cuff Size: Adult Regular)   Pulse 70   Temp 98.2  F (36.8  C) (Tympanic)   Resp 16   Ht 1.918 m (6' 3.5\")   Wt 103.4 kg (228 lb)   SpO2 98%   BMI 28.12 kg/m      Physical Exam  GENERAL: healthy, alert and no distress  EYES: Eyes grossly normal to inspection, PERRL and conjunctivae and sclerae normal  HENT: Dentition good.  Bilateral cerumen impaction  NECK: no adenopathy, no asymmetry, masses, or scars and thyroid normal to palpation  RESP: lungs clear to auscultation - no rales, rhonchi or wheezes  CV: regular rate and rhythm, normal S1 S2, no S3 or S4, no murmur, click or rub, no peripheral edema and peripheral pulses strong  ABDOMEN: soft, nontender, no hepatosplenomegaly, no masses and bowel sounds normal  MS: no gross musculoskeletal defects noted, no edema  SKIN: no suspicious lesions or rashes  NEURO: Normal strength and tone, mentation intact and speech normal  PSYCH: mentation appears normal, affect normal/bright        ASSESSMENT/PLAN:   1. Routine adult health maintenance  He lives an active and healthy lifestyle.  - Lipid panel reflex to direct LDL Fasting; Future  - Comprehensive metabolic panel (BMP + Alb, Alk Phos, ALT, AST, Total. Bili, TP); Future  - UA Macroscopic with reflex to Microscopic and Culture; Future  - CBC with platelets; Future    2. Pure hypercholesterolemia  Lipids for monitoring.    3. Numbness and tingling of left lower extremity  Probably due to his varicosities we will check thyroid and B12  - TSH with free T4 reflex; Future  - Vitamin B12; Future    4. Varicose veins of both lower extremities with pain  Continue compression follow-up with vascular    5. History of melanoma  Continue close " "follow-up with his dermatologist    6. Bilateral impacted cerumen    - REMOVE IMPACTED CERUMEN    7. Chronic pain of left knee  I have urged him to follow-up with orthopedics if this is an ongoing issue    Patient has been advised of split billing requirements and indicates understanding: Yes      COUNSELING:   Reviewed preventive health counseling, as reflected in patient instructions      BMI:   Estimated body mass index is 28.12 kg/m  as calculated from the following:    Height as of this encounter: 1.918 m (6' 3.5\").    Weight as of this encounter: 103.4 kg (228 lb).         He reports that he has never smoked. He has never used smokeless tobacco.        MATTHIEU EL MD  [facility]  "

## 2023-07-19 ENCOUNTER — OFFICE VISIT (OUTPATIENT)
Dept: VASCULAR SURGERY | Facility: CLINIC | Age: 48
End: 2023-07-19
Attending: SPECIALIST
Payer: COMMERCIAL

## 2023-07-19 VITALS
HEART RATE: 73 BPM | SYSTOLIC BLOOD PRESSURE: 116 MMHG | TEMPERATURE: 98 F | OXYGEN SATURATION: 99 % | RESPIRATION RATE: 12 BRPM | DIASTOLIC BLOOD PRESSURE: 77 MMHG

## 2023-07-19 DIAGNOSIS — I83.893 SYMPTOMATIC VARICOSE VEINS OF BOTH LOWER EXTREMITIES: ICD-10-CM

## 2023-07-19 DIAGNOSIS — M79.89 LEFT LEG SWELLING: Primary | ICD-10-CM

## 2023-07-19 PROCEDURE — 99214 OFFICE O/P EST MOD 30 MIN: CPT | Performed by: SPECIALIST

## 2023-07-19 PROCEDURE — 99213 OFFICE O/P EST LOW 20 MIN: CPT | Performed by: SPECIALIST

## 2023-07-19 ASSESSMENT — PAIN SCALES - GENERAL: PAINLEVEL: NO PAIN (0)

## 2023-07-19 NOTE — PROGRESS NOTES
[facility]        Patient is here for a 3 month follow up  to discuss Varicose vein(s). The patient has varicose veins that are problematic in bilateral legs. Symptoms patient has been experiencing are toe numbness, aching, discoloration and  swelling. Patient states their varicose veins are bothersome when  working. Patient  has been wearing compression stockings. Patient has not been using pain medication or anti-inflammatory's. Patient  has and has not had recent imaging on legs done.    Pt is currently taking no meds that would impact our treatment plan.    /77   Pulse 73   Temp 98  F (36.7  C)   Resp 12   SpO2 99%         > 3 month bilateral symp VV,swelling and discolotation continue after conservative tx,compression socks, US done 4/19/23 right SSV and left GSV venaseal option, will preauth [employer]

## 2023-07-19 NOTE — PATIENT INSTRUCTIONS
Pre-Procedure Instructions for Varicose Vein VenaSeal  We look forward to scheduling a varicose vein procedure for you. First, we will submit a prior authorization to your insurance company if required. This typically takes 10-14 days. We will contact you once we have gotten the approval to schedule the procedure.  The following is helpful information for you regarding your treatment:    It is ok to drive home after the procedure if you wish.     Please allow 1-2 hours for your vein procedure appointment.    Take your routine medications as you normally would except for blood thinners. Aspirin is ok to continue.    If you take Warfarin, Xarelto, or Eliquis this will need to be HELD prior to procedure according to primary care provider or cardiology who prescribes this medication. Please notify us if you take this medication.    We have thigh high compression stocking sizes medium to X-large that will be applied immediately after the procedure. You will need to provide your own if one of these sizes will not fit. Another option is to bring in knee high compression and biker compression shorts.     Please wear comfortable clothing.  We recommend that you bring a change of undergarment in case it gets stained by the cleansing solution.    Feel free to bring a personal music player or a CD to listen to during your procedure.    It is advised not to fly within 3 weeks after the procedure.    You do not have to fast prior to this procedure.  For any questions regarding your procedure please call [phone] to speak with the nurse.  If you would like a Good [place] Estimate for your upcoming procedure contact Cost of Care Estimates at [phone], advocates are available Monday through Friday 8am - 4:30pm.    VenaSeal Ablation Codes:  - [id number] for first vein in either leg    Varicose veins may be a sign of something more severe - venous reflux disease.  Healthy leg veins have valves that keep blood flowing to the heart.  Venous reflux disease develops when the valves stop working properly and allow blood to flow backward (i.e., reflux) and pool in the lower leg veins.   If venous reflux disease is left untreated, symptoms can worsen over time. Your doctor can help you understand if you have this condition.   Superficial venous reflux disease may cause the following signs and symptoms in your legs:    Varicose veins    Aching    Swelling    Cramping    Heaviness or tiredness    Itching    Restlessness    Open skin sores  Superficial venous reflux disease treatment aims to reduce or stop the backward flow of blood. The following may be prescribed to treat your superficial venous reflux disease. Your doctor can help you decide which treatment is best for you:     Compression stockings     Removing diseased vein     Closing diseased vein (through thermal or non-thermal treatment)         VenaSeal  Closure System  One non-thermal treatment option is the VenaSeal  Closure System, which improves blood flow and relieves symptoms by sealing-or closing-the diseased vein. The system delivers a small amount of a specially formulated medical adhesive to the diseased vein. The adhesive permanently seals the vein and blood is rerouted through nearby healthy veins.    The VenaSeal  closure system is a safe and effective treatment, providing significant improvements in quality of life.  The most common side effect was phlebitis (i.e., inflammation of a vein), and it typically occurred within the first 30 days after the procedure. Phlebitis is a commonly reported side effect in all vein treatments. Phlebitis occurred more frequently in VenaSeal  system-treated subjects than in RFA-treated subjects, though the difference was not statistically significant.           What can I expect of the VenaSeal  procedure?  Before the Procedure:  You will have an ultrasound imaging exam of the leg that is to be treated. This exam is important for assessing the  diseased superficial vein and planning the procedure.    During the Procedure:  Your doctor can discuss the procedure with you. A brief summary of what to expect is below:  You may feel some minor pain or stinging with a needle stick to numb the site where the doctor will access your vein.  Once the area is numb, your doctor will insert the catheter (i.e., a small hollow tube) into your leg. You may feel some pressure from the placement of the catheter.  The catheter will be placed in specific areas along the diseased vein to deliver small amounts of the medical adhesive. You may feel some mild sensation of pulling. Ultrasound will be used during the procedure to guide and position the catheter.    After treatment, the catheter is removed and a small adhesive bandage is placed over the puncture site.  Commonly Asked Questions  When will my symptoms improve?  Symptoms are caused by the diseased superficial vein. Thus, symptoms may improve as soon as the diseased vein is closed.    When can I return to normal activity?  The VenaSeal procedure is designed to reduce recovery time. Many patients return to normal activity immediately after the procedure. Your doctor can help you determine when you can return to normal activity.    Is the VenaSeal procedure painful?  Most patients feel little, if any, pain during the outpatient procedure..     Is there bruising after the VenaSeal  procedure?  Most patients report [id number] bruising after the VenaSeal procedure.    What happens to the VenaSeal  adhesive?  Only a very small amount of VenaSeal  adhesive is used to close the vein. Your body will naturally create scar tissue around the adhesive over time to keep the vessel permanently closed.    How does the VenaSeal  procedure differ from thermal energy procedures?  The VenaSeal  procedure uses an adhesive to close the superficial vein. Thermal energy procedures use heat to close the vein. The intense heat requires a large  volume of numbing medicine, which is injected through many needle sticks. The injections may cause pain and bruising after the procedure.    The VenaSeal  procedure may not be right for everyone. Your doctor can help you decide if the VenaSeal  procedure is right for you. The VenaSeal  procedure is contraindicated for individuals with any of the following conditions:  -Thrombophlebitis migraines (i.e., inflammation of a vein caused by a slow-moving blood clot)  -Acute superficial thrombophlebitis (i.e., inflammation of a vein caused by a blood clot)  -Previous hypersensitivity reactions to the VenaSeal  adhesive or cyanoacrylates  -Acute sepsis (i.e., whole-body inflammation caused by an immune response to an infection)      Recovering at home  Once at home, follow all the instructions you've been given. Be sure to:    Check for signs of infection at the catheter insertion sites (see below)    Wear thigh high compressions as directed    Keep your legs raised (elevated) as directed    Walk a few times a day    Avoid heavy exercise, lifting, and standing for long periods as advised. No lifting >20lbs for 2 weeks.     Avoid air travel, hot baths, saunas, or whirlpools as advised   Call your healthcare provider if you have any of the following:    Fever of 100.4 F (38 C) or higher, or as directed by your provider    Chest pain or trouble breathing    Signs of infection at the catheter insertion site. These include increased redness or swelling (inflammation), warmth, increasing pain, bleeding, or bad-smelling discharge.    Severe numbness or tingling in the treated leg    Severe pain or swelling in the treated leg     Follow-up  You'll have an ultrasound within the same week as the procedure to check for problems, such as blood clots. You will follow up with the provider after 6 weeks.   Risks and possible complications   These include the following:  Bleeding, Infection, Blood clots, Damage to the nerves in the treated  area, Irritation or burning of the skin over the treated vein. Treatment doesn't improve the look or the symptoms of the problem veins  Risks of any medicines used during the treatment

## 2023-07-19 NOTE — PROGRESS NOTES
Follow Up: Varicose Veins/ Venous Insufficiency    Deonte Abraham is a 47 year old  male here for followup. He has worn stockings now for 3 months. He was seen previously in April 2023.  Continued progression of disease and symptoms and issues; reviewed ultrasound results. Patient has ongoing symptoms with pain and swelling needing intervention with pain meds secondary to interfering with daily activities and work. This now inhibits daily chores and activities.     Allergies: No Known Allergies     Past Medical History:   Past Medical History:   Diagnosis Date     Malignant melanoma of torso excluding breast (H) 11/15/2016     Varicose veins of both lower extremities with pain         Past Social History:   Past Surgical History:   Procedure Laterality Date     ELBOW SURGERY          CURRENT MEDS:  Current Outpatient Medications   Medication     ascorbic acid, vitamin C, (ASCORBIC ACID WITH ALLIE HIPS) 500 MG tablet     cholecalciferol, vitamin D3, (VITAMIN D3) 1,000 unit capsule     DOCOSAHEXANOIC ACID/EPA (FISH OIL ORAL)     KRILL OIL PO     MULTIVITAMIN ORAL     No current facility-administered medications for this visit.       Family History   Problem Relation Age of Onset     Varicose Veins Mother      Hypertension Father         reports that he has never smoked. He has never used smokeless tobacco. He reports that he does not drink alcohol and does not use drugs.    Review of Systems:  Negative except continued symptoms and progression of issues while compression and conservative therapy.  Patient has symptomatic veins and changes of bilateral legs. These have progressed to the point of causing symptoms on a daily basis. This causes issues with daily activities and chores such as mowing lawn, outdoor upkeep and standing for long lengths of time. He has worn compression now for greater than 3 months, worked on an exercise and weight loss program and continues to have symptoms.     OBJECTIVE:  Vitals:     07/19/23 0802   BP: 116/77   Pulse: 73   Resp: 12   Temp: 98  F (36.7  C)   SpO2: 99%     There is no height or weight on file to calculate BMI.    EXAM:  GENERAL: This is a well-developed 47 year old male who appears his stated age  HEAD: normocephalic  HEENT: Pupils equal and reactive bilaterally  CARDIAC: RRR without murmur  CHEST/LUNG:  Clear to auscultation  ABDOMEN: Soft, nontender, nondistended, no masses    NEUROLOGIC: Focally intact, nonfocal  VASCULAR: Pulses intact, symmetrical upper and lower extremities. Varicose veins, Spider veins and Chronic venous stasis changes, bilateral      VCSS  PAIN:: Mild: Occasional, not restricting activity of requiring pain medication  Varicose Veins:: Severe: Extensive: Thigh and calf or great and small saphenous distribution  Venous Edema:: Mild: Evening ankle swelling only  Skin Pigmentation:: Mild: Diffuse, but limited in area and old (brown)  Inflamation:: Absent: None  Induration:: Absent: None  Number of active ulcers:: 0  Active ulcer duration:: None  Active ulcer diameter:: None  Compression Therapy:: Full compliance stockings + elevation  VCSS Score:: 9  Side:: Bilateral      Imaging:               Assessment/Plan:    She has  incompetency and insufficiency of the Rignt  Short  saphenous vein and left greater saphenous vein.  Right measures 5.7 mm at the junction, left measures 11.3. Deep systems are intact. No DVTs. Great candidate for endovenous  closure. We spent 20 today and discussed the procedure. The risks of anesthesia, infection, bleeding, clotting, DVTs, numbess at the insertion site dermatome and  the process and procedure were discussed. Answered all questions today. Will submit this to Deonte Abraham's insurance if needed for pre approval.Will set this up when approved. Discussed need to have a  and procedure woul take around 30 minutes with total time 2-3 hours. Also understands a small screening ultrasound 2-3 days out to rule out clot  formation at the closed vessel.    DIAGNOSIS: Venous insufficiency of the  left greater saphenous vein and right short saphenous vein      PROCEDURE: Endovenous closure of the left greater saphenous vein and right short saphenous vein    Ad Roger MD  General Surgery [phone]  Vascular Surgery [phone]

## 2023-10-30 ENCOUNTER — TELEPHONE (OUTPATIENT)
Dept: VASCULAR SURGERY | Facility: CLINIC | Age: 48
End: 2023-10-30
Payer: COMMERCIAL

## 2023-10-30 NOTE — TELEPHONE ENCOUNTER
Re-submitted PA request for [id number] + 05957 L GSV/R SSV VENASEAL under Dr. Fernando. Patient is scheduled 11/21. Cert# [id number]

## 2023-10-31 NOTE — TELEPHONE ENCOUNTER
Message left for patient to return call and reschedule venaseal with Dr Roger or change to RFA. Spoke with Patient will schedule venaseal with Dr Roger 12/12/ at 1pm. Patient is aware Dr Roger is not returning fors ure.

## 2023-10-31 NOTE — TELEPHONE ENCOUNTER
Dr. Fernando is unable to do chemical seals. Writer attempted to call and retract below request, but [employer] rep states they are unable to cancel requests submitted via online portal; she states writer will have to wait for the a response and resubmit again under Dr. Roger's name. Unable to get to portal again without submitting another request.

## 2023-12-26 ENCOUNTER — MYC MEDICAL ADVICE (OUTPATIENT)
Dept: VASCULAR SURGERY | Facility: CLINIC | Age: 48
End: 2023-12-26
Payer: COMMERCIAL

## 2024-01-02 ENCOUNTER — OFFICE VISIT (OUTPATIENT)
Dept: VASCULAR ULTRASOUND | Facility: CLINIC | Age: 49
End: 2024-01-02
Attending: SPECIALIST
Payer: COMMERCIAL

## 2024-01-02 VITALS — OXYGEN SATURATION: 98 % | SYSTOLIC BLOOD PRESSURE: 110 MMHG | DIASTOLIC BLOOD PRESSURE: 80 MMHG | HEART RATE: 85 BPM

## 2024-01-02 DIAGNOSIS — I83.893 SYMPTOMATIC VARICOSE VEINS OF BOTH LOWER EXTREMITIES: ICD-10-CM

## 2024-01-02 DIAGNOSIS — M79.89 LEFT LEG SWELLING: ICD-10-CM

## 2024-01-02 PROCEDURE — 250N000009 HC RX 250: Performed by: SPECIALIST

## 2024-01-02 PROCEDURE — 36482 ENDOVEN THER CHEM ADHES 1ST: CPT

## 2024-01-02 PROCEDURE — 36482 ENDOVEN THER CHEM ADHES 1ST: CPT | Mod: 50 | Performed by: SPECIALIST

## 2024-01-02 RX ORDER — LIDOCAINE HYDROCHLORIDE 20 MG/ML
5 INJECTION, SOLUTION INFILTRATION; PERINEURAL ONCE
Status: COMPLETED | OUTPATIENT
Start: 2024-01-02 | End: 2024-01-02

## 2024-01-02 RX ADMIN — LIDOCAINE HYDROCHLORIDE 5 ML: 20 INJECTION, SOLUTION INFILTRATION; PERINEURAL at 14:11

## 2024-01-02 ASSESSMENT — PAIN SCALES - GENERAL: PAINLEVEL: NO PAIN (0)

## 2024-01-02 NOTE — PROGRESS NOTES
Venaseal    Pre-Procedure:  Admit  [x]Consent for Venaseal Ablation of the   [x]Right Saphenous Vein and/or branches  [x]Left Saphenous Vein and/or branches  Vitals  [x]Vital signs per routine    Nursing Orders  [x]Vein Mapping of  [x]R extremity  [x]L extremity  [x]Sterile Prep to extremity    Medications  []Diazepam (Valium) 5mg PO, May Repeat x 1 for anxiety, relaxtion.    Post-Procedure:  Admission  [x]Post Procedure care - call physician for status update  []Admit to inpatient - Please document reason for admission in chart  Interventions require inpatient services   High risk of deterioration due to comorbidities   High risk of deterioration due to nature of admitting diagnosis  Location:    Vitals  [x]Vital signs per routine    Nursing Orders  [x]Thigh High Compression Stocking 20-30mm or 30-40mm to  [x]R extremity  [x]L extremity  [x]Check insertion site for bleeding or hematoma.  If bleeding or hematoma occurs, apply pressure and notify MD    Discharge  [x]Discharge home if no complications arise.  [x]Give post Venaseal Ablation discharge instructions to patient.  [x]Follow up venous ultrasound 72-96 hours after procedure.  [x]Follow up appointment with MD at 2 weeks.  [x]Contact MD for further questions

## 2024-01-02 NOTE — PATIENT INSTRUCTIONS
Discharge Instructions Following Venous Closure  Today, you had this procedure done:   - Vein closure via endoseal (VenaSeal)    Dressing  Leakage from the numbing medications the first few hours is normal if you had an RFA.   Wear your thigh high compression for 48 hours continuously until your ultrasound after the procedure.  It is ok to remove your stocking to shower in 24 hours but then reapply after.  Wear the thigh high stocking for two weeks after the procedure while you are up. It is ok to take off when you sleep.   After two weeks, you can transition into compression stockings of your choice.     Activity  On the day of the procedure, make sure to walk around the house for a few minutes each hour until bedtime.  The day after the procedure you should advance activity as tolerated.  NO strenuous activities though for 2 weeks.  In general, avoid prolonged standing in place and sitting with your legs down.  Both activities will cause blood to pool in your legs resulting in more swelling and discomfort.  Do not drive or operate heavy machinery for 24 hours after the procedure (excludes if you had a VenaSeal).   Do not take baths or use hot tubs or swimming pools until your incision site is healed.  Do not fly for the next 3 weeks.  Do not lift > 20 lbs. for 2 weeks.     Post Procedure Ultrasound & Follow-up  You will need an ultrasound within 2-3 days following the closure procedure.  Then plan to see your surgeon in the office six weeks after your procedure. Call us to schedule this appointment if one has not already been made.  Post Procedure Signs and Symptoms  There can be a mild amount of bruising and numbness after this procedure.  This will typically take a few weeks to fade.  You may feel pain or tenderness in your inner thigh.  Mild pain medication such as Tylenol or Ibuprofen maybe helpful.  It is normal to have fluid leaking from the injection areas the day of the procedure and it may be blood  tinged.      Call your healthcare provider if you have any of the following:  Fever of 100.4 F (38 C) or higher, or as directed by your provider  Chest pain or trouble breathing  Signs of infection at the catheter insertion site. These include increased redness or swelling (inflammation), warmth, increasing pain, bleeding, or bad-smelling discharge.  Severe numbness or tingling in the treated leg  Severe pain or swelling in the treated leg  For emergencies after 4:30pm Monday - Friday, holidays and weekends:  Dr. Ad Roger, [facility] Surgery at [phone]  Dr. Arslan Maria, [facility] Vascular at [phone]  Thank you for allowing us to be part of your care

## 2024-01-04 ENCOUNTER — ANCILLARY PROCEDURE (OUTPATIENT)
Dept: VASCULAR ULTRASOUND | Facility: CLINIC | Age: 49
End: 2024-01-04
Attending: SPECIALIST
Payer: COMMERCIAL

## 2024-01-04 DIAGNOSIS — M79.89 LEFT LEG SWELLING: ICD-10-CM

## 2024-01-04 DIAGNOSIS — I83.893 SYMPTOMATIC VARICOSE VEINS OF BOTH LOWER EXTREMITIES: ICD-10-CM

## 2024-01-04 PROCEDURE — 93971 EXTREMITY STUDY: CPT | Mod: LT

## 2024-01-04 PROCEDURE — 93971 EXTREMITY STUDY: CPT | Mod: 26 | Performed by: SURGERY

## 2024-02-20 ENCOUNTER — OFFICE VISIT (OUTPATIENT)
Dept: VASCULAR SURGERY | Facility: CLINIC | Age: 49
End: 2024-02-20
Attending: SPECIALIST
Payer: COMMERCIAL

## 2024-02-20 VITALS
SYSTOLIC BLOOD PRESSURE: 125 MMHG | HEART RATE: 61 BPM | DIASTOLIC BLOOD PRESSURE: 82 MMHG | RESPIRATION RATE: 14 BRPM | OXYGEN SATURATION: 97 %

## 2024-02-20 DIAGNOSIS — I83.893 SYMPTOMATIC VARICOSE VEINS OF BOTH LOWER EXTREMITIES: ICD-10-CM

## 2024-02-20 DIAGNOSIS — I83.813 VARICOSE VEINS OF BOTH LOWER EXTREMITIES WITH PAIN: Primary | ICD-10-CM

## 2024-02-20 PROCEDURE — 99212 OFFICE O/P EST SF 10 MIN: CPT | Performed by: SPECIALIST

## 2024-02-20 PROCEDURE — 99214 OFFICE O/P EST MOD 30 MIN: CPT | Performed by: SPECIALIST

## 2024-02-20 ASSESSMENT — PAIN SCALES - GENERAL: PAINLEVEL: NO PAIN (0)

## 2024-02-20 NOTE — PROGRESS NOTES
Post Procedure Note Endovenous Closure    S: Deonte Abraham is a 48 year old male S/P Right  short saphenous vein leg endovenous closure ofLeft  greater saphenous veinlower ext. 6 weeks out from procedure. Doing well, wore male  thigh high socks. Minimal discomfort. Some superficial phlibitis. Which is resolving.     O:   Vitals:    02/20/24 0903   BP: 125/82   Pulse: 61   Resp: 14   SpO2: 97%       Status: doing well    General: no apparent distress  Legs look good no signs of infection, incisions healing nicely.      Side:: Bilateral    Imaging:    Reviewed results of post closure showing right ssv and legt GSV closed.    Exam Information    Exam Date Exam Time Accession # Performing Department Results    1/4/24 12:16 PM [id number] [facility] [place]      PACS Images     Show images for US Venous Post Ablation Legs Bilateral     Study Result    Narrative & Impression   Bilateral Venous Ultrasound Status Post Chemical Ablation (Date: 01/04/24)        Indication: Follow-up saphenous vein Chemical ablation     Date of Procedure: Right 1/2/2024   Left 1/2/2024      Right POP V./SPJ Compression:  Fully Compressible     Bilateral CFV/SFJ Compression:  Fully Compressible     Reference:   (FC)-Fully Compressible           (PC)-Partially Compressible   (NC) Non-Compressible     Location Right SSV Left GSV   Proximal Thigh   NC   Mid Thigh   NC   Distal Thigh   NC   Knee NC NC   Proximal Calf NC NC   Mid Calf NC NC   Distal Calf NC FC               Impression: Noncompressible right small saphenous vein and left great saphenous vein consistent with recent ablation procedure.  No evidence of D       A/P: S/p endovenous closure. For insuffiencey of veins     May switch to knee high support socks   Resume all activities   RTC 6 months   Call for any questions or concerns     Ad Roger MD   [facility] Surgery

## 2024-02-20 NOTE — PROGRESS NOTES
[facility]        Patient is here for a 6 week follow up  to discuss 6 wks s/p venaseal left greater saphenous vein and right short saphenous vein 1/2/24     Pain in Lt groin at seal site after brisk walking. Numbness in big toe still. 2/10 when walking/ light exercise     Still wears compression stockings     Pt is currently taking no meds that would impact our treatment plan.    /82   Pulse 61   Resp 14   SpO2 97%     The provider has been notified that the patient has no concerns.     Questions patient would like addressed today are: N/A.    Refills are needed: N/A    Has homecare services and agency name:  No

## 2024-02-26 ENCOUNTER — TRANSFERRED RECORDS (OUTPATIENT)
Dept: HEALTH INFORMATION MANAGEMENT | Facility: CLINIC | Age: 49
End: 2024-02-26

## 2024-04-23 ENCOUNTER — OFFICE VISIT (OUTPATIENT)
Dept: INTERNAL MEDICINE | Facility: CLINIC | Age: 49
End: 2024-04-23
Payer: COMMERCIAL

## 2024-04-23 VITALS
RESPIRATION RATE: 10 BRPM | TEMPERATURE: 97.8 F | WEIGHT: 236 LBS | HEIGHT: 77 IN | SYSTOLIC BLOOD PRESSURE: 122 MMHG | OXYGEN SATURATION: 98 % | BODY MASS INDEX: 27.87 KG/M2 | DIASTOLIC BLOOD PRESSURE: 74 MMHG | HEART RATE: 65 BPM

## 2024-04-23 DIAGNOSIS — I83.813 VARICOSE VEINS OF BOTH LOWER EXTREMITIES WITH PAIN: ICD-10-CM

## 2024-04-23 DIAGNOSIS — Z00.00 ROUTINE ADULT HEALTH MAINTENANCE: Primary | ICD-10-CM

## 2024-04-23 DIAGNOSIS — E78.00 PURE HYPERCHOLESTEROLEMIA: ICD-10-CM

## 2024-04-23 DIAGNOSIS — Z85.820 HISTORY OF MELANOMA: ICD-10-CM

## 2024-04-23 LAB
ALBUMIN UR-MCNC: NEGATIVE MG/DL
APPEARANCE UR: CLEAR
BILIRUB UR QL STRIP: NEGATIVE
COLOR UR AUTO: YELLOW
ERYTHROCYTE [DISTWIDTH] IN BLOOD BY AUTOMATED COUNT: 12.4 % (ref 10–15)
GLUCOSE UR STRIP-MCNC: NEGATIVE MG/DL
HCT VFR BLD AUTO: 44.9 % (ref 40–53)
HGB BLD-MCNC: 14.6 G/DL (ref 13.3–17.7)
HGB UR QL STRIP: NEGATIVE
KETONES UR STRIP-MCNC: NEGATIVE MG/DL
LEUKOCYTE ESTERASE UR QL STRIP: NEGATIVE
MCH RBC QN AUTO: 29.5 PG (ref 26.5–33)
MCHC RBC AUTO-ENTMCNC: 32.5 G/DL (ref 31.5–36.5)
MCV RBC AUTO: 91 FL (ref 78–100)
NITRATE UR QL: NEGATIVE
PH UR STRIP: 5.5 [PH] (ref 5–8)
PLATELET # BLD AUTO: 164 10E3/UL (ref 150–450)
RBC # BLD AUTO: 4.95 10E6/UL (ref 4.4–5.9)
SP GR UR STRIP: 1.02 (ref 1–1.03)
UROBILINOGEN UR STRIP-ACNC: 0.2 E.U./DL
WBC # BLD AUTO: 5.1 10E3/UL (ref 4–11)

## 2024-04-23 PROCEDURE — 80053 COMPREHEN METABOLIC PANEL: CPT | Performed by: INTERNAL MEDICINE

## 2024-04-23 PROCEDURE — 36415 COLL VENOUS BLD VENIPUNCTURE: CPT | Performed by: INTERNAL MEDICINE

## 2024-04-23 PROCEDURE — 81003 URINALYSIS AUTO W/O SCOPE: CPT | Performed by: INTERNAL MEDICINE

## 2024-04-23 PROCEDURE — 99214 OFFICE O/P EST MOD 30 MIN: CPT | Mod: 25 | Performed by: INTERNAL MEDICINE

## 2024-04-23 PROCEDURE — 99396 PREV VISIT EST AGE 40-64: CPT | Performed by: INTERNAL MEDICINE

## 2024-04-23 PROCEDURE — 80061 LIPID PANEL: CPT | Performed by: INTERNAL MEDICINE

## 2024-04-23 PROCEDURE — 85027 COMPLETE CBC AUTOMATED: CPT | Performed by: INTERNAL MEDICINE

## 2024-04-23 PROCEDURE — G0103 PSA SCREENING: HCPCS | Performed by: INTERNAL MEDICINE

## 2024-04-23 SDOH — HEALTH STABILITY: PHYSICAL HEALTH: ON AVERAGE, HOW MANY MINUTES DO YOU ENGAGE IN EXERCISE AT THIS LEVEL?: 10 MIN

## 2024-04-23 SDOH — HEALTH STABILITY: PHYSICAL HEALTH: ON AVERAGE, HOW MANY DAYS PER WEEK DO YOU ENGAGE IN MODERATE TO STRENUOUS EXERCISE (LIKE A BRISK WALK)?: 4 DAYS

## 2024-04-23 ASSESSMENT — SOCIAL DETERMINANTS OF HEALTH (SDOH): HOW OFTEN DO YOU GET TOGETHER WITH FRIENDS OR RELATIVES?: ONCE A WEEK

## 2024-04-23 NOTE — PROGRESS NOTES
Preventive Care Visit  [facility]  MATTHIEU EL MD, Internal Medicine  Apr 23, 2024      1. Routine adult health maintenance  We discussed shingles vaccination.  I have urged him to do that in the next year or 2.  I counseled him today on prostate cancer screening.  Given his good health I think that we should start prostate cancer screening with yearly PSA.  We discussed the pros versus the cons of this and he agrees to start screening.  He lives an active and healthy lifestyle he will continue same.    2. Pure hypercholesterolemia  Lipids today for monitoring.  - Lipid panel reflex to direct LDL Non-fasting; Future    3. History of melanoma  Continue yearly visits with his dermatologist    4. Varicose veins of both lower extremities with pain  He continues to wear his compression.  He has done well after his procedure.    Cathi Clemons is a 48 year old, presenting for the following:  Physical (Fasting )         Health Care Directive  Patient does not have a Health Care Directive or Living Will: Discussed advance care planning with patient; information given to patient to review.    TERI Pierce comes in today for physical.  Reports doing well.  He was in a relationship for a while and his girlfriend moved in but then now she has moved out again.  They have been on again off again over the last 10 years.  She is a nurse practitioner originally from [place].  They are still friends.  He is not sexually active.  Does not wish to have any STI testing.  Always had safe intercourse.  Feels good.  Offers no somatic concerns for him.  Has not got a new dog.  Looking forward to gardening this summer.  Also doing a lot of woodworking still.    He is very pleased with how the varicose vein procedures went.    Nothing new in his family history.  Parents are doing well as his brother          4/23/2024   General Health   How would you rate your overall physical health? Good   Feel stress (tense,  anxious, or unable to sleep) Not at all         4/23/2024   Nutrition   Three or more servings of calcium each day? Yes   Diet: Regular (no restrictions)   How many servings of fruit and vegetables per day? (!) 2-3   How many sweetened beverages each day? 0-1         4/23/2024   Exercise   Days per week of moderate/strenous exercise 4 days   Average minutes spent exercising at this level 10 min         4/23/2024   Social Factors   Frequency of gathering with friends or relatives Once a week   Worry food won't last until get money to buy more No   Food not last or not have enough money for food? No   Do you have housing?  Yes   Are you worried about losing your housing? No   Lack of transportation? No   Unable to get utilities (heat,electricity)? No         4/23/2024   Fall Risk   Fallen 2 or more times in the past year? No   Trouble with walking or balance? No          4/23/2024   Dental   Dentist two times every year? (!) NO         4/23/2024   TB Screening   Were you born outside of the [place]? No         Today's PHQ-2 Score:       4/23/2024     8:34 AM   PHQ-2 ( 1999 [employer])   Q1: Little interest or pleasure in doing things 0   Q2: Feeling down, depressed or hopeless 0   PHQ-2 Score 0   Q1: Little interest or pleasure in doing things Not at all   Q2: Feeling down, depressed or hopeless Not at all   PHQ-2 Score 0           4/23/2024   Substance Use   Alcohol more than 3/day or more than 7/wk Not Applicable   Do you use any other substances recreationally? No     Social History     Tobacco Use    Smoking status: Never    Smokeless tobacco: Never   Substance Use Topics    Alcohol use: No    Drug use: No           4/23/2024   STI Screening   New sexual partner(s) since last STI/HIV test? (!) YES    ASCVD Risk   The 10-year ASCVD risk score (Emily LEON, et al., 2019) is: 3.9%    Values used to calculate the score:      Age: 48 years      Sex: Male      Is Non- : No      Diabetic: No       "Tobacco smoker: No      Systolic Blood Pressure: 122 mmHg      Is BP treated: No      HDL Cholesterol: 42 mg/dL      Total Cholesterol: 204 mg/dL            4/23/2024   Contraception/Family Planning   Questions about contraception or family planning No        Reviewed and updated as needed this visit by Provider                             Objective    Exam  /74 (BP Location: Left arm, Patient Position: Sitting, Cuff Size: Adult Large)   Pulse 65   Temp 97.8  F (36.6  C) (Tympanic)   Resp 10   Ht 1.943 m (6' 4.5\")   Wt 107 kg (236 lb)   SpO2 98%   BMI 28.35 kg/m     Estimated body mass index is 28.35 kg/m  as calculated from the following:    Height as of this encounter: 1.943 m (6' 4.5\").    Weight as of this encounter: 107 kg (236 lb).    Physical Exam  GENERAL: alert and no distress  EYES: Eyes grossly normal to inspection, PERRL and conjunctivae and sclerae normal  HENT: ear canals and TM's normal, nose and mouth without ulcers or lesions  NECK: no adenopathy, no asymmetry, masses, or scars  RESP: lungs clear to auscultation - no rales, rhonchi or wheezes  CV: regular rate and rhythm, normal S1 S2, no S3 or S4, no murmur, click or rub, no peripheral edema  ABDOMEN: soft, nontender, no hepatosplenomegaly, no masses and bowel sounds normal  MS: no gross musculoskeletal defects noted, no edema  SKIN: no suspicious lesions or rashes  NEURO: Normal strength and tone, mentation intact and speech normal  PSYCH: mentation appears normal, affect normal/bright        Signed Electronically by: MATTHIEU EL MD    "

## 2024-04-23 NOTE — NURSING NOTE
NIRMAL was faxed to:    [facility]  Phone: [phone]  Fax: [phone]    [facility]  Phone: [phone]  Fax: [phone]

## 2024-04-24 LAB
ALBUMIN SERPL BCG-MCNC: 4.7 G/DL (ref 3.5–5.2)
ALP SERPL-CCNC: 64 U/L (ref 40–150)
ALT SERPL W P-5'-P-CCNC: 21 U/L (ref 0–70)
ANION GAP SERPL CALCULATED.3IONS-SCNC: 11 MMOL/L (ref 7–15)
AST SERPL W P-5'-P-CCNC: 24 U/L (ref 0–45)
BILIRUB SERPL-MCNC: 1 MG/DL
BUN SERPL-MCNC: 15.2 MG/DL (ref 6–20)
CALCIUM SERPL-MCNC: 9.4 MG/DL (ref 8.6–10)
CHLORIDE SERPL-SCNC: 102 MMOL/L (ref 98–107)
CHOLEST SERPL-MCNC: 220 MG/DL
CREAT SERPL-MCNC: 1.15 MG/DL (ref 0.67–1.17)
DEPRECATED HCO3 PLAS-SCNC: 26 MMOL/L (ref 22–29)
EGFRCR SERPLBLD CKD-EPI 2021: 78 ML/MIN/1.73M2
FASTING STATUS PATIENT QL REPORTED: YES
GLUCOSE SERPL-MCNC: 93 MG/DL (ref 70–99)
HDLC SERPL-MCNC: 41 MG/DL
LDLC SERPL CALC-MCNC: 154 MG/DL
NONHDLC SERPL-MCNC: 179 MG/DL
POTASSIUM SERPL-SCNC: 4.3 MMOL/L (ref 3.4–5.3)
PROT SERPL-MCNC: 7.5 G/DL (ref 6.4–8.3)
PSA SERPL DL<=0.01 NG/ML-MCNC: 2.75 NG/ML (ref 0–3.5)
SODIUM SERPL-SCNC: 139 MMOL/L (ref 135–145)
TRIGL SERPL-MCNC: 126 MG/DL

## 2024-05-28 NOTE — PATIENT INSTRUCTIONS
"t is important to remember that venous reflux disease is a life-long disease, and you should wear compression stockings as much as you can. They do not need to be worn at night while in bed. It is especially important to wear compression with long periods of sitting, standing, long car rides or if you will be flying. Compression socks should get refilled every 4-6 months. If you do a lot of standing it is good to do calf raises to help keep the blood pumping. If you sit a lot at work, it is good to get up periodically to walk around. Elevation of the foot of your bed 4-6\" helps the blood return back to where it is needed. We can refill your compression prescription for 1 year otherwise your primary care provider is able to refill them for you. Below is a list of places you may go to get your compression stockings.    Please call us with any issues or questions [phone].    [facility]  [address] [place] [address]  Phone: [phone]  Fax: [phone] [facility]  [address] [place] [address]  Phone: [phone]  Fax: [phone]   [facility]  [address] [place] [address]  Phone: [phone]  Fax: [phone] [facility]  [address]  [address] [place] [address]  Phone: [phone]  Fax: [phone]   [facility]  [address] [place] [address]  Phone: [phone] [facility]   [address] ([facility])  [place] [address]  Phone: [phone]   [facility]  [address]  [address] [place] [address]      Phone: [phone]  Fax: [phone] [address] [place] [address]      Phone: [phone]  Fax: [phone]     [facility]" Supply [email or web address]  [address], [place] [address]  [phone]    [facility]  [email or web address]  [address] [place] [address]  Phone: [phone]     [address] [place] [address]  Phone: [phone] [address], [place] [address]  Phone: [phone] [address], [place] [address]  Phone: [phone]    [facility] [email or web address]  [address], [place] [address]  Phone: [phone]    [address] [place] [address]  Phone: [phone] [address], [place] [address]  Phone: [phone]    [facility]  [email or web address]  [phone]

## 2024-06-11 ENCOUNTER — OFFICE VISIT (OUTPATIENT)
Dept: VASCULAR SURGERY | Facility: CLINIC | Age: 49
End: 2024-06-11
Attending: SPECIALIST
Payer: COMMERCIAL

## 2024-06-11 VITALS
OXYGEN SATURATION: 99 % | DIASTOLIC BLOOD PRESSURE: 82 MMHG | RESPIRATION RATE: 12 BRPM | SYSTOLIC BLOOD PRESSURE: 122 MMHG | HEART RATE: 71 BPM

## 2024-06-11 DIAGNOSIS — I83.893 SYMPTOMATIC VARICOSE VEINS OF BOTH LOWER EXTREMITIES: Primary | ICD-10-CM

## 2024-06-11 PROCEDURE — 99214 OFFICE O/P EST MOD 30 MIN: CPT | Performed by: SPECIALIST

## 2024-06-11 PROCEDURE — 99213 OFFICE O/P EST LOW 20 MIN: CPT | Performed by: SPECIALIST

## 2024-06-11 ASSESSMENT — PAIN SCALES - GENERAL: PAINLEVEL: NO PAIN (0)

## 2024-06-11 NOTE — PROGRESS NOTES
[facility]        Patient is here for a 4 month follow up to discuss right SSV and left GSV venaseal done on 1/2/24. Pt noticed that when he kneels on right knee has stiffness that is new and unsure if related to the closure. Pt wearing compression stockings when working Mon-Fri for about 10 hours a day. Pt reports his leg swelling has improved.     Pt is currently taking no meds that would impact our treatment plan.    /82   Pulse 71   Resp 12   SpO2 99%     The provider has been notified that the patient has no concerns.     Questions patient would like addressed today are: N/A.    Refills are needed: N/A    Has homecare services and agency name:  No

## 2024-07-08 NOTE — PROGRESS NOTES
[facility] Surgery Follow up    HPI:    48 year old year old male who returns for a follow up. Patient is here for a 4 month follow up to discuss right SSV and left GSV venaseal done on 1/2/24. Pt noticed that when he kneels on right knee has stiffness that is new and unsure if related to the closure. Pt wearing compression stockings when working Mon-Fri for about 10 hours a day. Pt reports his leg swelling has improved.        Allergies:Patient has no known allergies.    Past Medical History:   Diagnosis Date    Malignant melanoma of torso excluding breast (H) 11/15/2016    Varicose veins of both lower extremities with pain        Past Surgical History:   Procedure Laterality Date    ELBOW SURGERY         CURRENT MEDS:  Current Outpatient Medications   Medication Sig Dispense Refill    ascorbic acid, vitamin C, (ASCORBIC ACID WITH ALLIE HIPS) 500 MG tablet [ASCORBIC ACID, VITAMIN C, (ASCORBIC ACID WITH ALLIE HIPS) 500 MG TABLET] Take 500 mg by mouth daily.      cholecalciferol, vitamin D3, (VITAMIN D3) 1,000 unit capsule [CHOLECALCIFEROL, VITAMIN D3, (VITAMIN D3) 1,000 UNIT CAPSULE] Take 1,000 Units by mouth daily.      DOCOSAHEXANOIC ACID/EPA (FISH OIL ORAL) [DOCOSAHEXANOIC ACID/EPA (FISH OIL ORAL)] Take 1 capsule by mouth daily.      KRILL OIL PO Take 1 capsule by mouth daily      MULTIVITAMIN ORAL [MULTIVITAMIN ORAL] Take 1 tablet by mouth daily.       No current facility-administered medications for this visit.       Family History   Problem Relation Age of Onset    Varicose Veins Mother     Hypertension Father         reports that he has never smoked. He has never used smokeless tobacco. He reports that he does not drink alcohol and does not use drugs.    Review of Systems:  Negative except leg issues. Improved. Closure 4 months out. Otherwise twelve system of review is negative.      OBJECTIVE:  Vitals:    06/11/24 0826   BP: 122/82   Pulse: 71   Resp: 12   SpO2: 99%     There is no height or weight on file to  "calculate BMI.    Status: doing well    EXAM:  GENERAL: This is a well-developed 48 year old male who appears his stated age  HEAD: normocephalic  HEENT: Pupils equal and reactive bilaterally  CARDIAC: RRR without murmur  CHEST/LUNG:  Clear to auscultation  ABDOMEN: Soft, nontender, nondistended, no masses    NEUROLOGIC: Focally intact, nonfocal  VASCULAR: Pulses intact, symmetrical upper and lower extremities.              Side:: Bilateral  VCSS  PAIN:: Absent: None  Varicose Veins:: Mild: Few scattered  Venous Edema:: Absent: None  Skin Pigmentation:: Absent: None  Inflamation:: Absent: None  Induration:: Absent: None  Number of active ulcers:: 0  Active ulcer duration:: None  Active ulcer diameter:: None  Compression Therapy:: Wears elastric stockings most days  VCSS Score:: 3  CEAP:: Simple varicose veins only    LABS:  Lab Results   Component Value Date    WBC 5.1 04/23/2024    HGB 14.6 04/23/2024    HCT 44.9 04/23/2024    MCV 91 04/23/2024     04/23/2024     INR/Prothrombin Time  @LABRCNTIP(NA,K,CL,co2,bun,creatinine,labglom,glucose,calcium)@  No results found for: \"HGBA1C\"  Lab Results   Component Value Date    ALT 21 04/23/2024    AST 24 04/23/2024    ALKPHOS 64 04/23/2024        Images:     Reviewed post closure films and studies.    Exam Information    Exam Date Exam Time Accession # Performing Department Results    1/4/24 12:16 PM [id number] [facility] [place]      PACS Images     Show images for US Venous Post Ablation Legs Bilateral     Study Result    Narrative & Impression   Bilateral Venous Ultrasound Status Post Chemical Ablation (Date: 01/04/24)        Indication: Follow-up saphenous vein Chemical ablation     Date of Procedure: Right 1/2/2024   Left 1/2/2024      Right POP V./SPJ Compression:  Fully Compressible     Bilateral CFV/SFJ Compression:  Fully Compressible     Reference:   (FC)-Fully Compressible           (PC)-Partially Compressible "   (NC) Non-Compressible     Location Right SSV Left GSV   Proximal Thigh   NC   Mid Thigh   NC   Distal Thigh   NC   Knee NC NC   Proximal Calf NC NC   Mid Calf NC NC   Distal Calf NC FC               Impression: Noncompressible right small saphenous vein and left great saphenous vein consistent with recent ablation procedure.  No evidence of DVT          Assessment/Plan:   Symptomatic varicose veins of both lower extremities     Doing well post closure.  Of both the left greater saphenous vein right short saphenous vein decrease in the size of veins decrease in the swelling no major issues at this time plan will continue compression exercise and program if things change he will come back for follow-up visit discussed and answered all questions him today.         Return if symptoms worsen or fail to improve.     Ad Roger MD ,MD  [facility] Department of Surgery

## 2025-04-21 SDOH — HEALTH STABILITY: PHYSICAL HEALTH: ON AVERAGE, HOW MANY MINUTES DO YOU ENGAGE IN EXERCISE AT THIS LEVEL?: 60 MIN

## 2025-04-21 SDOH — HEALTH STABILITY: PHYSICAL HEALTH: ON AVERAGE, HOW MANY DAYS PER WEEK DO YOU ENGAGE IN MODERATE TO STRENUOUS EXERCISE (LIKE A BRISK WALK)?: 3 DAYS

## 2025-04-21 ASSESSMENT — SOCIAL DETERMINANTS OF HEALTH (SDOH): HOW OFTEN DO YOU GET TOGETHER WITH FRIENDS OR RELATIVES?: ONCE A WEEK

## 2025-04-23 ENCOUNTER — OFFICE VISIT (OUTPATIENT)
Dept: INTERNAL MEDICINE | Facility: CLINIC | Age: 50
End: 2025-04-23
Payer: COMMERCIAL

## 2025-04-23 VITALS
RESPIRATION RATE: 14 BRPM | SYSTOLIC BLOOD PRESSURE: 106 MMHG | HEART RATE: 55 BPM | TEMPERATURE: 97.7 F | BODY MASS INDEX: 25.39 KG/M2 | HEIGHT: 76 IN | OXYGEN SATURATION: 100 % | WEIGHT: 208.5 LBS | DIASTOLIC BLOOD PRESSURE: 74 MMHG

## 2025-04-23 DIAGNOSIS — R44.2 OLFACTORY HALLUCINATIONS: ICD-10-CM

## 2025-04-23 DIAGNOSIS — R63.4 WEIGHT LOSS: ICD-10-CM

## 2025-04-23 DIAGNOSIS — H61.23 BILATERAL IMPACTED CERUMEN: ICD-10-CM

## 2025-04-23 DIAGNOSIS — Z85.820 HISTORY OF MELANOMA: ICD-10-CM

## 2025-04-23 DIAGNOSIS — Z00.00 ROUTINE GENERAL MEDICAL EXAMINATION AT A HEALTH CARE FACILITY: Primary | ICD-10-CM

## 2025-04-23 DIAGNOSIS — D69.6 LOW PLATELET COUNT: ICD-10-CM

## 2025-04-23 DIAGNOSIS — E78.00 PURE HYPERCHOLESTEROLEMIA: ICD-10-CM

## 2025-04-23 DIAGNOSIS — M25.562 ARTHRALGIA OF LEFT LOWER LEG: ICD-10-CM

## 2025-04-23 LAB
ALBUMIN UR-MCNC: NEGATIVE MG/DL
APPEARANCE UR: CLEAR
BILIRUB UR QL STRIP: NEGATIVE
COLOR UR AUTO: YELLOW
ERYTHROCYTE [DISTWIDTH] IN BLOOD BY AUTOMATED COUNT: 12.3 % (ref 10–15)
GLUCOSE UR STRIP-MCNC: NEGATIVE MG/DL
HCT VFR BLD AUTO: 42.3 % (ref 40–53)
HGB BLD-MCNC: 14 G/DL (ref 13.3–17.7)
HGB UR QL STRIP: NEGATIVE
KETONES UR STRIP-MCNC: NEGATIVE MG/DL
LEUKOCYTE ESTERASE UR QL STRIP: NEGATIVE
MCH RBC QN AUTO: 30.4 PG (ref 26.5–33)
MCHC RBC AUTO-ENTMCNC: 33.1 G/DL (ref 31.5–36.5)
MCV RBC AUTO: 92 FL (ref 78–100)
NITRATE UR QL: NEGATIVE
PH UR STRIP: 7.5 [PH] (ref 5–8)
PLATELET # BLD AUTO: 144 10E3/UL (ref 150–450)
RBC # BLD AUTO: 4.61 10E6/UL (ref 4.4–5.9)
SP GR UR STRIP: 1.02 (ref 1–1.03)
UROBILINOGEN UR STRIP-ACNC: 0.2 E.U./DL
WBC # BLD AUTO: 4.4 10E3/UL (ref 4–11)

## 2025-04-23 PROCEDURE — 1125F AMNT PAIN NOTED PAIN PRSNT: CPT | Performed by: INTERNAL MEDICINE

## 2025-04-23 PROCEDURE — 69209 REMOVE IMPACTED EAR WAX UNI: CPT | Mod: 50 | Performed by: INTERNAL MEDICINE

## 2025-04-23 PROCEDURE — 80053 COMPREHEN METABOLIC PANEL: CPT | Performed by: INTERNAL MEDICINE

## 2025-04-23 PROCEDURE — 3078F DIAST BP <80 MM HG: CPT | Performed by: INTERNAL MEDICINE

## 2025-04-23 PROCEDURE — 36415 COLL VENOUS BLD VENIPUNCTURE: CPT | Performed by: INTERNAL MEDICINE

## 2025-04-23 PROCEDURE — 84443 ASSAY THYROID STIM HORMONE: CPT | Performed by: INTERNAL MEDICINE

## 2025-04-23 PROCEDURE — G0103 PSA SCREENING: HCPCS | Performed by: INTERNAL MEDICINE

## 2025-04-23 PROCEDURE — 80061 LIPID PANEL: CPT | Performed by: INTERNAL MEDICINE

## 2025-04-23 PROCEDURE — 99396 PREV VISIT EST AGE 40-64: CPT | Performed by: INTERNAL MEDICINE

## 2025-04-23 PROCEDURE — 81003 URINALYSIS AUTO W/O SCOPE: CPT | Performed by: INTERNAL MEDICINE

## 2025-04-23 PROCEDURE — 3074F SYST BP LT 130 MM HG: CPT | Performed by: INTERNAL MEDICINE

## 2025-04-23 PROCEDURE — 85027 COMPLETE CBC AUTOMATED: CPT | Performed by: INTERNAL MEDICINE

## 2025-04-23 ASSESSMENT — ANXIETY QUESTIONNAIRES
IF YOU CHECKED OFF ANY PROBLEMS ON THIS QUESTIONNAIRE, HOW DIFFICULT HAVE THESE PROBLEMS MADE IT FOR YOU TO DO YOUR WORK, TAKE CARE OF THINGS AT HOME, OR GET ALONG WITH OTHER PEOPLE: NOT DIFFICULT AT ALL
6. BECOMING EASILY ANNOYED OR IRRITABLE: NOT AT ALL
GAD7 TOTAL SCORE: 0
2. NOT BEING ABLE TO STOP OR CONTROL WORRYING: NOT AT ALL
1. FEELING NERVOUS, ANXIOUS, OR ON EDGE: NOT AT ALL
GAD7 TOTAL SCORE: 0
7. FEELING AFRAID AS IF SOMETHING AWFUL MIGHT HAPPEN: NOT AT ALL
4. TROUBLE RELAXING: NOT AT ALL
5. BEING SO RESTLESS THAT IT IS HARD TO SIT STILL: NOT AT ALL
GAD7 TOTAL SCORE: 0
7. FEELING AFRAID AS IF SOMETHING AWFUL MIGHT HAPPEN: NOT AT ALL
8. IF YOU CHECKED OFF ANY PROBLEMS, HOW DIFFICULT HAVE THESE MADE IT FOR YOU TO DO YOUR WORK, TAKE CARE OF THINGS AT HOME, OR GET ALONG WITH OTHER PEOPLE?: NOT DIFFICULT AT ALL
3. WORRYING TOO MUCH ABOUT DIFFERENT THINGS: NOT AT ALL

## 2025-04-23 ASSESSMENT — PATIENT HEALTH QUESTIONNAIRE - PHQ9
SUM OF ALL RESPONSES TO PHQ QUESTIONS 1-9: 2
10. IF YOU CHECKED OFF ANY PROBLEMS, HOW DIFFICULT HAVE THESE PROBLEMS MADE IT FOR YOU TO DO YOUR WORK, TAKE CARE OF THINGS AT HOME, OR GET ALONG WITH OTHER PEOPLE: NOT DIFFICULT AT ALL
SUM OF ALL RESPONSES TO PHQ QUESTIONS 1-9: 2

## 2025-04-23 ASSESSMENT — PAIN SCALES - GENERAL: PAINLEVEL_OUTOF10: MILD PAIN (1)

## 2025-04-23 NOTE — PATIENT INSTRUCTIONS
Patient Education   Preventive Care Advice   This is general advice given by our system to help you stay healthy. However, your care team may have specific advice just for you. Please talk to your care team about your preventive care needs.  Nutrition  Eat 5 or more servings of fruits and vegetables each day.  Try wheat bread, brown rice and whole grain pasta (instead of white bread, rice, and pasta).  Get enough calcium and vitamin D. Check the label on foods and aim for 100% of the RDA (recommended daily allowance).  Lifestyle  Exercise at least 150 minutes each week  (30 minutes a day, 5 days a week).  Do muscle strengthening activities 2 days a week. These help control your weight and prevent disease.  No smoking.  Wear sunscreen to prevent skin cancer.  Have a dental exam and cleaning every 6 months.  Yearly exams  See your health care team every year to talk about:  Any changes in your health.  Any medicines your care team has prescribed.  Preventive care, family planning, and ways to prevent chronic diseases.  Shots (vaccines)   HPV shots (up to age 26), if you've never had them before.  Hepatitis B shots (up to age 59), if you've never had them before.  COVID-19 shot: Get this shot when it's due.  Flu shot: Get a flu shot every year.  Tetanus shot: Get a tetanus shot every 10 years.  Pneumococcal, hepatitis A, and RSV shots: Ask your care team if you need these based on your risk.  Shingles shot (for age 50 and up)  General health tests  Diabetes screening:  Starting at age 35, Get screened for diabetes at least every 3 years.  If you are younger than age 35, ask your care team if you should be screened for diabetes.  Cholesterol test: At age 39, start having a cholesterol test every 5 years, or more often if advised.  Bone density scan (DEXA): At age 50, ask your care team if you should have this scan for osteoporosis (brittle bones).  Hepatitis C: Get tested at least once in your life.  STIs (sexually  transmitted infections)  Before age 24: Ask your care team if you should be screened for STIs.  After age 24: Get screened for STIs if you're at risk. You are at risk for STIs (including HIV) if:  You are sexually active with more than one person.  You don't use condoms every time.  You or a partner was diagnosed with a sexually transmitted infection.  If you are at risk for HIV, ask about PrEP medicine to prevent HIV.  Get tested for HIV at least once in your life, whether you are at risk for HIV or not.  Cancer screening tests  Cervical cancer screening: If you have a cervix, begin getting regular cervical cancer screening tests starting at age 21.  Breast cancer scan (mammogram): If you've ever had breasts, begin having regular mammograms starting at age 40. This is a scan to check for breast cancer.  Colon cancer screening: It is important to start screening for colon cancer at age 45.  Have a colonoscopy test every 10 years (or more often if you're at risk) Or, ask your provider about stool tests like a FIT test every year or Cologuard test every 3 years.  To learn more about your testing options, visit:   .  For help making a decision, visit:   https://bit.ly/kg62640.  Prostate cancer screening test: If you have a prostate, ask your care team if a prostate cancer screening test (PSA) at age 55 is right for you.  Lung cancer screening: If you are a current or former smoker ages 50 to 80, ask your care team if ongoing lung cancer screenings are right for you.  For informational purposes only. Not to replace the advice of your health care provider. Copyright   2023 [facility]. All rights reserved. Clinically reviewed by the [facility] Transitions Program. [employer] [id number] - REV 01/24.  Eating Healthy Foods: Care Instructions  With every meal, you can make healthy food choices. Try to eat a variety of fruits, vegetables, whole grains, lean proteins, and low-fat dairy products. This can help  "you get the right balance of nutrients, including vitamins and minerals. Small changes add up over time. You can start by adding one healthy food to your meals each day.    Try to make half your plate fruits and vegetables, one-fourth whole grains, and one-fourth lean proteins. Try including dairy with your meals.   Eat more fruits and vegetables. Try to have them with most meals and snacks.   Foods for healthy eating        Fruits   These can be fresh, frozen, canned, or dried.  Try to choose whole fruit rather than fruit juice.  Eat a variety of colors.        Vegetables   These can be fresh, frozen, canned, or dried.  Beans, peas, and lentils count too.        Whole grains   Choose whole-grain breads, cereals, and noodles.  Try brown rice.        Lean proteins   These can include lean meat, poultry, fish, and eggs.  You can also have tofu, beans, peas, lentils, nuts, and seeds.        Dairy   Try milk, yogurt, and cheese.  Choose low-fat or fat-free when you can.  If you need to, use lactose-free milk or fortified plant-based milk products, such as soy milk.        Water   Drink water when you're thirsty.  Limit sugar-sweetened drinks, including soda, fruit drinks, and sports drinks.  Where can you learn more?  Go to https://www.Lumoid.net/patiented  Enter T756 in the search box to learn more about \"Eating Healthy Foods: Care Instructions.\"  Current as of: October 7, 2024  Content Version: 14.4    2538-1739 [employer].   Care instructions adapted under license by your healthcare professional. If you have questions about a medical condition or this instruction, always ask your healthcare professional. [employer] disclaims any warranty or liability for your use of this information.    Learning About Stress  What is stress?     Stress is your body's response to a hard situation. Your body can have a physical, emotional, or mental response. Stress is a fact of life for most people, and it " affects everyone differently. What causes stress for you may not be stressful for someone else.  A lot of things can cause stress. You may feel stress when you go on a job interview, take a test, or run a race. This kind of short-term stress is normal and even useful. It can help you if you need to work hard or react quickly. For example, stress can help you finish an important job on time.  Long-term stress is caused by ongoing stressful situations or events. Examples of long-term stress include long-term health problems, ongoing problems at work, or conflicts in your family. Long-term stress can harm your health.  How does stress affect your health?  When you are stressed, your body responds as though you are in danger. It makes hormones that speed up your heart, make you breathe faster, and give you a burst of energy. This is called the fight-or-flight stress response. If the stress is over quickly, your body goes back to normal and no harm is done.  But if stress happens too often or lasts too long, it can have bad effects. Long-term stress can make you more likely to get sick, and it can make symptoms of some diseases worse. If you tense up when you are stressed, you may develop neck, shoulder, or low back pain. Stress is linked to high blood pressure and heart disease.  Stress also harms your emotional health. It can make you champion, tense, or depressed. Your relationships may suffer, and you may not do well at work or school.  What can you do to manage stress?  You can try these things to help manage stress:   Do something active. Exercise or activity can help reduce stress. Walking is a great way to get started. Even everyday activities such as housecleaning or yard work can help.  Try yoga or rhina chi. These techniques combine exercise and meditation. You may need some training at first to learn them.  Do something you enjoy. For example, listen to music or go to a movie. Practice your hobby or do volunteer  "work.  Meditate. This can help you relax, because you are not worrying about what happened before or what may happen in the future.  Do guided imagery. Imagine yourself in any setting that helps you feel calm. You can use online videos, books, or a teacher to guide you.  Do breathing exercises. For example:  From a standing position, bend forward from the waist with your knees slightly bent. Let your arms dangle close to the floor.  Breathe in slowly and deeply as you return to a standing position. Roll up slowly and lift your head last.  Hold your breath for just a few seconds in the standing position.  Breathe out slowly and bend forward from the waist.  Let your feelings out. Talk, laugh, cry, and express anger when you need to. Talking with supportive friends or family, a counselor, or a kilo leader about your feelings is a healthy way to relieve stress. Avoid discussing your feelings with people who make you feel worse.  Write. It may help to write about things that are bothering you. This helps you find out how much stress you feel and what is causing it. When you know this, you can find better ways to cope.  What can you do to prevent stress?  You might try some of these things to help prevent stress:  Manage your time. This helps you find time to do the things you want and need to do.  Get enough sleep. Your body recovers from the stresses of the day while you are sleeping.  Get support. Your family, friends, and community can make a difference in how you experience stress.  Limit your news feed. Avoid or limit time on social media or news that may make you feel stressed.  Do something active. Exercise or activity can help reduce stress. Walking is a great way to get started.  Where can you learn more?  Go to https://www.[employer].net/patiented  Enter N032 in the search box to learn more about \"Learning About Stress.\"  Current as of: October 24, 2024  Content Version: 14.4 2024-2025 [employer] " "[employer].   Care instructions adapted under license by your healthcare professional. If you have questions about a medical condition or this instruction, always ask your healthcare professional. [employer] disclaims any warranty or liability for your use of this information.    Learning About Being Physically Active  What is physical activity?     Being physically active means doing any kind of activity that gets your body moving.  The types of physical activity that can help you get fit and stay healthy include:  Aerobic or \"cardio\" activities. These make your heart beat faster and make you breathe harder, such as brisk walking, riding a bike, or running. They strengthen your heart and lungs and build up your endurance.  Strength training activities. These make your muscles work against, or \"resist,\" something. Examples include lifting weights or doing push-ups. These activities help tone and strengthen your muscles and bones.  Stretches. These let you move your joints and muscles through their full range of motion. Stretching helps you be more flexible.  Reaching a balance between these three types of physical activity is important because each one contributes to your overall fitness.  What are the benefits of being active?  Being active is one of the best things you can do for your health. It helps you to:  Feel stronger and have more energy to do all the things you like to do.  Focus better at school or work.  Feel, think, and sleep better.  Reach and stay at a healthy weight.  Lose fat and build lean muscle.  Lower your risk for serious health problems, including diabetes, heart attack, high blood pressure, and some cancers.  Keep your heart, lungs, bones, muscles, and joints strong and healthy.  How can you make being active part of your life?  Start slowly. Make it your long-term goal to get at least 30 minutes of exercise on most days of the week. Walking is a good choice. You also may want to do " "other activities, such as running, swimming, cycling, or playing tennis or team sports.  Pick activities that you like--ones that make your heart beat faster, your muscles stronger, and your muscles and joints more flexible. If you find more than one thing you like doing, do them all. You don't have to do the same thing every day.  Get your heart pumping every day. Any activity that makes your heart beat faster and keeps it at that rate for a while counts.  Here are some great ways to get your heart beating faster:  Go for a brisk walk, run, or hike.  Go for a swim or bike ride.  Take an online exercise class or dance.  Play a game of touch football, basketball, or soccer.  Play tennis, pickleball, or racquetball.  Climb stairs.  Even some household chores can be aerobic. Just do them at a faster pace. Raking or mowing the lawn, sweeping the garage, and vacuuming and cleaning your home all can help get your heart rate up.  Strengthen your muscles during the week. You don't have to lift heavy weights or grow big, bulky muscles to get stronger. Doing a few simple activities that make your muscles work against, or \"resist,\" something can help you get stronger. Aim for at least twice a week.  For example, you can:  Do push-ups or sit-ups, which use your own body weight as resistance.  Lift weights or dumbbells or use stretch bands at home or in a gym or community center.  Stretch your muscles often. Stretching will help you as you become more active. It can help you stay flexible and loosen tight muscles. It can also help improve your balance and posture and can be a great way to relax.  Be sure to stretch the muscles you'll be using when you work out. It's best to warm your muscles slightly before you stretch them. Walk or do some other light aerobic activity for a few minutes. Then start stretching.  When you stretch your muscles:  Do it slowly. Stretching is not about going fast or making sudden movements.  Don't " "push or bounce during a stretch.  Hold each stretch for at least 15 to 30 seconds, if you can. You should feel a stretch in the muscle, but not pain.  Breathe out as you do the stretch. Then breathe in as you hold the stretch. Don't hold your breath.  If you're worried about how more activity might affect your health, have a checkup before you start. Follow any special advice your doctor gives you for getting a smart start.  Where can you learn more?  Go to https://www.Fighters.net/patiented  Enter W332 in the search box to learn more about \"Learning About Being Physically Active.\"  Current as of: July 31, 2024  Content Version: 14.4    6185-0925 [employer].   Care instructions adapted under license by your healthcare professional. If you have questions about a medical condition or this instruction, always ask your healthcare professional. [employer] disclaims any warranty or liability for your use of this information.       "

## 2025-04-23 NOTE — PROGRESS NOTES
RN ear assessment completed prior to ear irrigation. Otoscopic exam reveals cerumen present and irrigation advised, Bilateral ears. Post Ear Irrigation exam completed and cerumen plug removed and tympanic membrane intact in R ear. L ear irrigated multiple times, plug not removed. Patient declines continuing irrigation in L ear at this time and states he will apply drops and irrigate L ear at home. Will follow up if he feels it is needed.   Pain assessment completed, no pain.    Patient tolerated procedure:  yes    Kandice JANE RN

## 2025-04-23 NOTE — PROGRESS NOTES
"Preventive Care Visit  [facility]  MATTHIEU EL MD, Internal Medicine  Apr 23, 2025      Assessment & Plan         1. Routine general medical examination at a health care facility ([facility])  He lives a very active and healthy lifestyle.  Continue same.  I will see him back yearly.  We discussed immunizations today and he declines  - PSA, screen; Future  - Comprehensive metabolic panel (BMP + Alb, Alk Phos, ALT, AST, Total. Bili, TP); Future  - CBC with platelets; Future  - UA Macroscopic with reflex to Microscopic and Culture - Lab Collect; Future  - TSH with free T4 reflex; Future    2. Pure hypercholesterolemia  Lipids today for monitoring  - Lipid panel reflex to direct LDL Non-fasting; Future    3. Olfactory hallucinations  I question if this is a allergic reaction type of issue with the cat.  I did recommend he undergo MRI to look for potential brain masses.  If that is negative and that symptoms persist would have him meet with neurology.  - MR Brain w/o Contrast; Future    4. Weight loss  I think this has been intentional.  Check thyroid however  - TSH with free T4 reflex; Future    5. History of melanoma  It has been a while since he has had his skin exams I urged him to get that set up.    6. Arthralgia of left lower leg  He continues to have left knee issues.  If continues to bother him he will follow-up with orthopedics.    7. Bilateral impacted cerumen  Cerumen removal today.  - REMOVE IMPACTED CERUMEN         BMI  Estimated body mass index is 25.05 kg/m  as calculated from the following:    Height as of this encounter: 1.943 m (6' 4.5\").    Weight as of this encounter: 94.6 kg (208 lb 8 oz).       Counseling  Appropriate preventive services were addressed with this patient via screening, questionnaire, or discussion as appropriate for fall prevention, nutrition, physical activity, Tobacco-use cessation, social engagement, weight loss and cognition.  Checklist reviewing preventive " "services available has been given to the patient.  Reviewed patient's diet, addressing concerns and/or questions.   He is at risk for lack of exercise and has been provided with information to increase physical activity for the benefit of his well-being.   The patient was instructed to see the dentist every 6 months.   He is at risk for psychosocial distress and has been provided with information to reduce risk.           Cathi Clemons is a 49 year old, presenting for the following:  Wellness Visit (Pt would like to disscuss: Pt reports that he has had the sensation intermittently that \"I smells like cigarette smoke intermittently - has been better in the last month\" \"I wonder if this is stress related\" /Pt states that he is not around anyone that smokes. ) and Pain (Left knee pain upon exertion - pt reports wearing compression stockings  )           HPI  Comes in today for his annual physical.  He is gotten into biking.  He has several bikes now and bikes extensively even on a  in the winter.  He has lost a fair amount of weight as a result.  He feels quite good.  His only concern is smelling of fairly significant intense smoke when there is no cigarette smoke around him.  This occurs almost on a daily basis although it is getting better.  It started when he was having some stress at work as well as when he had cats that he was allergic to in the house.  Cat is now out of the house but and it is getting better but he still has it.  He has no other neurologic symptoms but he has heard that this can be a symptom of brain tumor or early Parkinson's so he is worried about this.  There is no family history of parkinsonism.  Again no other neurologic symptoms.  He is eating healthy.  No current significant other.  Not dating.  Non-smoker nondrinker.  Enjoys gardening and he is doing a lot of woodworking.  Still selling raised garden beds.         Advance Care Planning    Discussed advance care planning with " patient; informed AVS has link to [employer].        4/21/2025   General Health   How would you rate your overall physical health? Good   Feel stress (tense, anxious, or unable to sleep) To some extent   (!) STRESS CONCERN      4/21/2025   Nutrition   Three or more servings of calcium each day? Yes   Diet: Regular (no restrictions)   How many servings of fruit and vegetables per day? (!) 2-3   How many sweetened beverages each day? 0-1         4/21/2025   Exercise   Days per week of moderate/strenous exercise 3 days   Average minutes spent exercising at this level 60 min         4/21/2025   Social Factors   Frequency of gathering with friends or relatives Once a week   Worry food won't last until get money to buy more No   Food not last or not have enough money for food? No   Do you have housing? (Housing is defined as stable permanent housing and does not include staying ouside in a car, in a tent, in an abandoned building, in an overnight shelter, or couch-surfing.) Yes   Are you worried about losing your housing? No   Lack of transportation? No   Unable to get utilities (heat,electricity)? No         4/21/2025   Fall Risk   Fallen 2 or more times in the past year? No   Trouble with walking or balance? No          4/21/2025   Dental   Dentist two times every year? (!) NO       Today's PHQ-9 Score:       4/23/2025     8:00 AM   PHQ-9 SCORE   PHQ-9 Total Score MyChart 2 (Minimal depression)   PHQ-9 Total Score 2        Proxy-reported         4/21/2025   Substance Use   Alcohol more than 3/day or more than 7/wk Not Applicable   Do you use any other substances recreationally? No     Social History     Tobacco Use    Smoking status: Never    Smokeless tobacco: Never   Substance Use Topics    Alcohol use: No    Drug use: No           4/21/2025   STI Screening   New sexual partner(s) since last STI/HIV test? No   ASCVD Risk   The 10-year ASCVD risk score (Emily LEON, et al., 2019) is: 3.8%    Values used to  "calculate the score:      Age: 49 years      Sex: Male      Is Non- : No      Diabetic: No      Tobacco smoker: No      Systolic Blood Pressure: 106 mmHg      Is BP treated: No      HDL Cholesterol: 41 mg/dL      Total Cholesterol: 220 mg/dL           Reviewed and updated as needed this visit by Provider                             Objective    Exam  /74 (BP Location: Left arm, Patient Position: Sitting, Cuff Size: Adult Regular)   Pulse 55   Temp 97.7  F (36.5  C) (Temporal)   Resp 14   Ht 1.943 m (6' 4.5\")   Wt 94.6 kg (208 lb 8 oz)   SpO2 100%   BMI 25.05 kg/m     Estimated body mass index is 25.05 kg/m  as calculated from the following:    Height as of this encounter: 1.943 m (6' 4.5\").    Weight as of this encounter: 94.6 kg (208 lb 8 oz).    Physical Exam  GENERAL: alert and no distress  EYES: Eyes grossly normal to inspection, PERRL and conjunctivae and sclerae normal  HENT: ear canals and TM's normal, nose and mouth without ulcers or lesions  NECK: no adenopathy, no asymmetry, masses, or scars  RESP: lungs clear to auscultation - no rales, rhonchi or wheezes  CV: regular rate and rhythm, normal S1 S2, no S3 or S4, no murmur, click or rub, no peripheral edema  ABDOMEN: soft, nontender, no hepatosplenomegaly, no masses and bowel sounds normal  MS: no gross musculoskeletal defects noted, no edema  SKIN: no suspicious lesions or rashes  NEURO: Normal strength and tone, mentation intact and speech normal  PSYCH: mentation appears normal, affect normal/bright        Signed Electronically by: MATTHIEU EL MD    .undefined[^^  "

## 2025-04-24 ENCOUNTER — TELEPHONE (OUTPATIENT)
Dept: INTERNAL MEDICINE | Facility: CLINIC | Age: 50
End: 2025-04-24
Payer: COMMERCIAL

## 2025-04-24 LAB
ALBUMIN SERPL BCG-MCNC: 4.6 G/DL (ref 3.5–5.2)
ALP SERPL-CCNC: 49 U/L (ref 40–150)
ALT SERPL W P-5'-P-CCNC: 18 U/L (ref 0–70)
ANION GAP SERPL CALCULATED.3IONS-SCNC: 8 MMOL/L (ref 7–15)
AST SERPL W P-5'-P-CCNC: 23 U/L (ref 0–45)
BILIRUB SERPL-MCNC: 1.3 MG/DL
BUN SERPL-MCNC: 17.8 MG/DL (ref 6–20)
CALCIUM SERPL-MCNC: 9.4 MG/DL (ref 8.8–10.4)
CHLORIDE SERPL-SCNC: 104 MMOL/L (ref 98–107)
CHOLEST SERPL-MCNC: 182 MG/DL
CREAT SERPL-MCNC: 1.19 MG/DL (ref 0.67–1.17)
EGFRCR SERPLBLD CKD-EPI 2021: 74 ML/MIN/1.73M2
FASTING STATUS PATIENT QL REPORTED: ABNORMAL
FASTING STATUS PATIENT QL REPORTED: ABNORMAL
GLUCOSE SERPL-MCNC: 92 MG/DL (ref 70–99)
HCO3 SERPL-SCNC: 28 MMOL/L (ref 22–29)
HDLC SERPL-MCNC: 56 MG/DL
LDLC SERPL CALC-MCNC: 114 MG/DL
NONHDLC SERPL-MCNC: 126 MG/DL
POTASSIUM SERPL-SCNC: 4.3 MMOL/L (ref 3.4–5.3)
PROT SERPL-MCNC: 6.9 G/DL (ref 6.4–8.3)
PSA SERPL DL<=0.01 NG/ML-MCNC: 2.39 NG/ML (ref 0–3.5)
SODIUM SERPL-SCNC: 140 MMOL/L (ref 135–145)
TRIGL SERPL-MCNC: 58 MG/DL
TSH SERPL DL<=0.005 MIU/L-ACNC: 1.6 UIU/ML (ref 0.3–4.2)

## 2025-04-24 NOTE — TELEPHONE ENCOUNTER
April 24, 2025    Outside records received from [facility].  Records were placed in the inbox for Dr. Paris to review.  A copy was sent to HIM to be scanned into the patient's chart under procedures    Gill Shaw

## 2025-05-02 ENCOUNTER — HOSPITAL ENCOUNTER (OUTPATIENT)
Dept: MRI IMAGING | Facility: HOSPITAL | Age: 50
Discharge: HOME | End: 2025-05-02
Attending: INTERNAL MEDICINE | Admitting: INTERNAL MEDICINE
Payer: COMMERCIAL

## 2025-05-02 DIAGNOSIS — R44.2 OLFACTORY HALLUCINATIONS: ICD-10-CM

## 2025-05-02 PROCEDURE — 70551 MRI BRAIN STEM W/O DYE: CPT

## 2025-06-26 ENCOUNTER — TRANSFERRED RECORDS (OUTPATIENT)
Dept: HEALTH INFORMATION MANAGEMENT | Facility: CLINIC | Age: 50
End: 2025-06-26
Payer: COMMERCIAL